# Patient Record
Sex: MALE | Race: OTHER | HISPANIC OR LATINO | URBAN - METROPOLITAN AREA
[De-identification: names, ages, dates, MRNs, and addresses within clinical notes are randomized per-mention and may not be internally consistent; named-entity substitution may affect disease eponyms.]

---

## 2018-04-12 VITALS
DIASTOLIC BLOOD PRESSURE: 81 MMHG | HEART RATE: 98 BPM | OXYGEN SATURATION: 99 % | RESPIRATION RATE: 18 BRPM | TEMPERATURE: 99 F | SYSTOLIC BLOOD PRESSURE: 170 MMHG

## 2018-04-12 RX ORDER — CHLORHEXIDINE GLUCONATE 213 G/1000ML
1 SOLUTION TOPICAL ONCE
Qty: 0 | Refills: 0 | Status: DISCONTINUED | OUTPATIENT
Start: 2018-04-13 | End: 2018-04-14

## 2018-04-12 NOTE — H&P ADULT - HISTORY OF PRESENT ILLNESS
#[id number]  79 yo Polish Speaking male, former smoker, Fhx of CAD/MI, SHELLFISH Allergy (rxn: rash), with a PMHx of known severe 3VCAD s/p 3VCABG  (LIMA-LAD patent, SVG-OM occluded, SVG-D1 occluded, SVG-RPDA occluded by angiogram 2017), [place] [facility] [place] JADA to prox/mid/distal RCA, ischemic cardiomyopathy, HTN, hyperlipidemia presented to cardiologist complaining of substernal chest pain radiating to his back and left arm associated with SOB and dizziness, occurring independent of activity x several months. Patient reports symptoms are exacerbated when ambulating more than 1-2 city blocks relieved with rest and taking SL NTG. Patient reports he uses up to 3 SL NTG/day to relieve chest discomfort. He denies syncope, leg edema, PND, orthopnea, palpitations, N/V, diaphoresis.  Patient was referred for Exercise NST (2018) revealed moderate sized, reversible defect of the lateral wall with moderate ischemia. Small to moderate myocardial perfusion defect of the inferior wall with moderate ischemia. LVEF 40%. Reduced myocardial thickening and motion of the inferior and lateral wall of LV. Echo (2018) revealed basal inferior wall hypokinetic, LVEF 45-50%, trace MR, LA dilated. In light of patient’s risk factors, extensive history of CAD and CCS Angina Class IV Symptoms, pt now presents for cardiac catheterization with possible intervention if clinically indicated.   Cardiac Catheterization Hx  Cardiac Catheterization (2017 [place] [facility] [place] Anatomy: pLM 90% stenosis, pLAD 95% stenosis, 1st Diag 80% tubular stenosis of proximal third of vessel. pLCx 90% stenosis, pRCA 70% tubular stenosis (culprit), mRCA 995% stenosis at distal margin of stented segment (culprit), dRCA 70% ISR (culprit), Graft to 1st Dia% stenosis at proximal anastomosis, Graft to OM1 100% stenosis at proximal anastomosis, graft to RPDA 100% stenosis at proximal anastomosis. LVEF 35%. Intervention: JADA pRCA, JADA mRCA, JADA dRCA. Residual disease of very distal RCA and RPDA is not amenable to PCI and will require ongoing medication therapy.  #[id number]  77 yo English/English Speaking male, former smoker, Fhx of CAD/MI, SHELLFISH Allergy (rxn: rash), with a PMHx of known severe 3VCAD s/p 3VCABG  (LIMA-LAD patent, SVG-OM occluded, SVG-D1 occluded, SVG-RPDA occluded by angiogram 2017), [place] [facility] [place]ADA to prox/mid/distal RCA, ischemic cardiomyopathy, HTN, hyperlipidemia presented to cardiologist complaining of substernal chest pain radiating to his back and left arm associated with SOB and dizziness, occurring independent of activity x several months. Patient reports symptoms are exacerbated when ambulating more than 1-2 city blocks relieved with rest and taking SL NTG. Patient reports he uses up to 3 SL NTG/day to relieve chest discomfort. He denies syncope, leg edema, PND, orthopnea, palpitations, N/V, diaphoresis.  Patient was referred for Exercise NST (2018) revealed moderate sized, reversible defect of the lateral wall with moderate ischemia. Small to moderate myocardial perfusion defect of the inferior wall with moderate ischemia. LVEF 40%. Reduced myocardial thickening and motion of the inferior and lateral wall of LV. Echo (2018) revealed basal inferior wall hypokinetic, LVEF 45-50%, trace MR, LA dilated. In light of patient’s risk factors, extensive history of CAD and CCS Angina Class IV Symptoms, pt now presents for cardiac catheterization with possible intervention if clinically indicated.   Cardiac Catheterization Hx  [place] [facility] [place]atomy: pLM 90% stenosis, pLAD 95% stenosis, 1st Diag 80% tubular stenosis of proximal third of vessel. pLCx 90% stenosis, pRCA 70% tubular stenosis (culprit), mRCA 995% stenosis at distal margin of stented segment (culprit), dRCA 70% ISR (culprit), Graft to 1st Dia% stenosis at proximal anastomosis, Graft to OM1 100% stenosis at proximal anastomosis, graft to RPDA 100% stenosis at proximal anastomosis. LVEF 35%. Intervention: JADA pRCA, JADA mRCA, JADA dRCA. Residual disease of very distal RCA and RPDA is not amenable to PCI and will require ongoing medication therapy.  #[id number]  77 yo Japanese/English Speaking male, former smoker, Fhx of CAD/MI, SHELLFISH Allergy (rxn: rash), with a PMHx of stage 3 CKD (CR: 1.5 as outpatient per Dr. Garcia). known severe 3VCAD s/p 3VCABG  (LIMA-LAD patent, SVG-OM occluded, SVG-D1 occluded, SVG-RPDA occluded by angiogram 2017), [place] [facility] [place]ADA to prox/mid/distal RCA, ischemic cardiomyopathy, HTN, hyperlipidemia presented to cardiologist complaining of substernal chest pain radiating to his back and left arm associated with SOB and dizziness, occurring independent of activity x several months. Patient reports symptoms are exacerbated when ambulating more than 1-2 city blocks relieved with rest and taking SL NTG. Patient reports he uses up to 3 SL NTG/day to relieve chest discomfort. He denies syncope, leg edema, PND, orthopnea, palpitations, N/V, diaphoresis.  Patient was referred for Exercise NST (2018) revealed moderate sized, reversible defect of the lateral wall with moderate ischemia. Small to moderate myocardial perfusion defect of the inferior wall with moderate ischemia. LVEF 40%. Reduced myocardial thickening and motion of the inferior and lateral wall of LV. Echo (2018) revealed basal inferior wall hypokinetic, LVEF 45-50%, trace MR, LA dilated. In light of patient’s risk factors, extensive history of CAD and CCS Angina Class IV Symptoms, pt now presents for cardiac catheterization with possible intervention if clinically indicated.   Cardiac Catheterization Hx  [place] [facility] [place]atomy: pLM 90% stenosis, pLAD 95% stenosis, 1st Diag 80% tubular stenosis of proximal third of vessel. pLCx 90% stenosis, pRCA 70% tubular stenosis (culprit), mRCA 995% stenosis at distal margin of stented segment (culprit), dRCA 70% ISR (culprit), Graft to 1st Dia% stenosis at proximal anastomosis, Graft to OM1 100% stenosis at proximal anastomosis, graft to RPDA 100% stenosis at proximal anastomosis. LVEF 35%. Intervention: JADA pRCA, JADA mRCA, JADA dRCA. Residual disease of very distal RCA and RPDA is not amenable to PCI and will require ongoing medication therapy.

## 2018-04-12 NOTE — H&P ADULT - ASSESSMENT
77 yo New Zealander Speaking male, former smoker, Fhx of CAD/MI, SHELLFISH Allergy (rxn: rash), with a PMHx of known severe 3VCAD s/p 3VCABG 1990 (LIMA-LAD patent, SVG-OM occluded, SVG-D1 occluded, SVG-RPDA occluded by angiogram 11/2017), hx of NSTEMI 11/2017 @ [facility] [place] with JADA to prox/mid/distal RCA, ischemic cardiomyopathy, HTN, hyperlipidemia presents for cardiac catheterization with possible intervention if clinically indicated due to patient's  risk factors, extensive history of CAD and CCS Angina Class IV Symptoms.      Risks & benefits of procedure and alternative therapy have been explained to the patient including but not limited to: allergic reaction, bleeding w/possible need for blood transfusion, infection, renal and vascular compromise, limb damage, arrhythmia, stroke, vessel dissection/perforation, Myocardial infarction, emergent CABG. Informed consent obtained and in chart. 79 yo Mosotho Speaking male, former smoker, Fhx of CAD/MI, SHELLFISH Allergy (rxn: rash), with a PMHx of known severe 3VCAD s/p 3VCABG 1990 (LIMA-LAD patent, SVG-OM occluded, SVG-D1 occluded, SVG-RPDA occluded by angiogram 11/2017), [place]x of [place] 11[place] [facility] [place] with JADA to prox/mid/distal RCA, ischemic cardiomyopathy, HTN, hyperlipidemia presents for cardiac catheterization with possible intervention if clinically indicated due to patient's  risk factors, extensive history of CAD and CCS Angina Class IV Symptoms.    -Pt reports compliance with ASA/Brilinta; did not take this AM; will give maintenance dose  -Patient allergic to all shellfish; with reaction of rash. Patient does not recall being premedicated in past for catheterization. Will discuss further with Dr. Garcia.     Risks & benefits of procedure and alternative therapy have been explained to the patient including but not limited to: allergic reaction, bleeding w/possible need for blood transfusion, infection, renal and vascular compromise, limb damage, arrhythmia, stroke, vessel dissection/perforation, Myocardial infarction, emergent CABG. Informed consent obtained and in chart. 79 yo Maldivian Speaking male, former smoker, Fhx of CAD/MI, SHELLFISH Allergy (rxn: rash), with a PMHx of known severe 3VCAD s/p 3VCABG 1990 (LIMA-LAD patent, SVG-OM occluded, SVG-D1 occluded, SVG-RPDA occluded by angiogram 11/2017), hx of NSTEMI 11/2017 @ [facility] [place] with JADA to prox/mid/distal RCA, ischemic cardiomyopathy, HTN, hyperlipidemia presents for cardiac catheterization with possible intervention if clinically indicated due to patient's  risk factors, extensive history of CAD and CCS Angina Class IV Symptoms.    -Pt reports compliance with ASA/Brilinta; did not take this AM; will give maintenance dose  -CR: 1.41 today; Dr. Garcia aware; no comparison labs available prior to procedure. NS @ 75 cc/hour started upon arrival to cath lab.   -Patient allergic to all shellfish; with reaction of rash. Per Dr. Garcia Solucortef 200 mg IV x one dose and Benadryl on call ordered.     Risks & benefits of procedure and alternative therapy have been explained to the patient including but not limited to: allergic reaction, bleeding w/possible need for blood transfusion, infection, renal and vascular compromise, limb damage, arrhythmia, stroke, vessel dissection/perforation, Myocardial infarction, emergent CABG. Informed consent obtained and in chart. 77 yo Northern Irish Speaking male, former smoker, Fhx of CAD/MI, SHELLFISH Allergy (rxn: rash), with a PMHx of known severe 3VCAD s/p 3VCABG 1990 (LIMA-LAD patent, SVG-OM occluded, SVG-D1 occluded, SVG-RPDA occluded by angiogram 11/2017), [place] of [place] [facility] [place] with JADA to prox/mid/distal RCA, ischemic cardiomyopathy, HTN, hyperlipidemia presents for cardiac catheterization with possible intervention if clinically indicated due to patient's  risk factors, extensive history of CAD and CCS Angina Class IV Symptoms.    -Pt reports compliance with ASA/Brilinta; did not take this AM; will give maintenance dose  -CR: 1.41 today; per Dr. Garcia CR: 1.5 as outpatient. . NS @ 75 cc/hour started upon arrival to cath lab.   -Patient allergic to all shellfish; with reaction of rash. Per Dr. Garcia Solucortef 200 mg IV x one dose and Benadryl on call ordered.     Risks & benefits of procedure and alternative therapy have been explained to the patient including but not limited to: allergic reaction, bleeding w/possible need for blood transfusion, infection, renal and vascular compromise, limb damage, arrhythmia, stroke, vessel dissection/perforation, Myocardial infarction, emergent CABG. Informed consent obtained and in chart.

## 2018-04-13 ENCOUNTER — INPATIENT (INPATIENT)
Facility: HOSPITAL | Age: 78
LOS: 0 days | Discharge: HOME | DRG: 246 | End: 2018-04-14
Attending: INTERNAL MEDICINE | Admitting: INTERNAL MEDICINE
Payer: MEDICARE

## 2018-04-13 DIAGNOSIS — Z95.1 PRESENCE OF AORTOCORONARY BYPASS GRAFT: Chronic | ICD-10-CM

## 2018-04-13 LAB
ANION GAP SERPL CALC-SCNC: 14 MMOL/L — SIGNIFICANT CHANGE UP (ref 5–17)
APTT BLD: 31.7 SEC — SIGNIFICANT CHANGE UP (ref 27.5–37.4)
BASOPHILS NFR BLD AUTO: 0.5 % — SIGNIFICANT CHANGE UP (ref 0–2)
BUN SERPL-MCNC: 21 MG/DL — SIGNIFICANT CHANGE UP (ref 7–23)
CALCIUM SERPL-MCNC: 10 MG/DL — SIGNIFICANT CHANGE UP (ref 8.4–10.5)
CHLORIDE SERPL-SCNC: 101 MMOL/L — SIGNIFICANT CHANGE UP (ref 96–108)
CK MB CFR SERPL CALC: 1.8 NG/ML — SIGNIFICANT CHANGE UP (ref 0–6.7)
CO2 SERPL-SCNC: 26 MMOL/L — SIGNIFICANT CHANGE UP (ref 22–31)
CREAT SERPL-MCNC: 1.41 MG/DL — HIGH (ref 0.5–1.3)
CRP SERPL-MCNC: 0.26 MG/DL — SIGNIFICANT CHANGE UP (ref 0–0.4)
EOSINOPHIL NFR BLD AUTO: 4.1 % — SIGNIFICANT CHANGE UP (ref 0–6)
GLUCOSE SERPL-MCNC: 101 MG/DL — HIGH (ref 70–99)
HBA1C BLD-MCNC: 5.8 % — HIGH (ref 4–5.6)
HCT VFR BLD CALC: 42.7 % — SIGNIFICANT CHANGE UP (ref 39–50)
HGB BLD-MCNC: 14 G/DL — SIGNIFICANT CHANGE UP (ref 13–17)
INR BLD: 1.01 — SIGNIFICANT CHANGE UP (ref 0.88–1.16)
LYMPHOCYTES # BLD AUTO: 34.7 % — SIGNIFICANT CHANGE UP (ref 13–44)
MCHC RBC-ENTMCNC: 29.4 PG — SIGNIFICANT CHANGE UP (ref 27–34)
MCHC RBC-ENTMCNC: 32.8 G/DL — SIGNIFICANT CHANGE UP (ref 32–36)
MCV RBC AUTO: 89.7 FL — SIGNIFICANT CHANGE UP (ref 80–100)
MONOCYTES NFR BLD AUTO: 7.4 % — SIGNIFICANT CHANGE UP (ref 2–14)
NEUTROPHILS NFR BLD AUTO: 53.3 % — SIGNIFICANT CHANGE UP (ref 43–77)
PLATELET # BLD AUTO: 200 K/UL — SIGNIFICANT CHANGE UP (ref 150–400)
POTASSIUM SERPL-MCNC: 4.3 MMOL/L — SIGNIFICANT CHANGE UP (ref 3.5–5.3)
POTASSIUM SERPL-SCNC: 4.3 MMOL/L — SIGNIFICANT CHANGE UP (ref 3.5–5.3)
PROTHROM AB SERPL-ACNC: 11.2 SEC — SIGNIFICANT CHANGE UP (ref 9.8–12.7)
RBC # BLD: 4.76 M/UL — SIGNIFICANT CHANGE UP (ref 4.2–5.8)
RBC # FLD: 14.6 % — SIGNIFICANT CHANGE UP (ref 10.3–16.9)
SODIUM SERPL-SCNC: 141 MMOL/L — SIGNIFICANT CHANGE UP (ref 135–145)
WBC # BLD: 8.8 K/UL — SIGNIFICANT CHANGE UP (ref 3.8–10.5)
WBC # FLD AUTO: 8.8 K/UL — SIGNIFICANT CHANGE UP (ref 3.8–10.5)

## 2018-04-13 PROCEDURE — 93459 L HRT ART/GRFT ANGIO: CPT | Mod: 26,XU

## 2018-04-13 PROCEDURE — 93571 IV DOP VEL&/PRESS C FLO 1ST: CPT | Mod: 26

## 2018-04-13 PROCEDURE — 92928 PRQ TCAT PLMT NTRAC ST 1 LES: CPT | Mod: RC

## 2018-04-13 RX ORDER — HYDROCORTISONE 20 MG
200 TABLET ORAL ONCE
Qty: 0 | Refills: 0 | Status: COMPLETED | OUTPATIENT
Start: 2018-04-13 | End: 2018-04-13

## 2018-04-13 RX ORDER — LISINOPRIL 2.5 MG/1
20 TABLET ORAL DAILY
Qty: 0 | Refills: 0 | Status: DISCONTINUED | OUTPATIENT
Start: 2018-04-13 | End: 2018-04-14

## 2018-04-13 RX ORDER — TICAGRELOR 90 MG/1
90 TABLET ORAL
Qty: 0 | Refills: 0 | Status: DISCONTINUED | OUTPATIENT
Start: 2018-04-14 | End: 2018-04-14

## 2018-04-13 RX ORDER — ASPIRIN/CALCIUM CARB/MAGNESIUM 324 MG
81 TABLET ORAL ONCE
Qty: 0 | Refills: 0 | Status: COMPLETED | OUTPATIENT
Start: 2018-04-13 | End: 2018-04-13

## 2018-04-13 RX ORDER — SODIUM CHLORIDE 9 MG/ML
500 INJECTION INTRAMUSCULAR; INTRAVENOUS; SUBCUTANEOUS
Qty: 0 | Refills: 0 | Status: DISCONTINUED | OUTPATIENT
Start: 2018-04-13 | End: 2018-04-13

## 2018-04-13 RX ORDER — TICAGRELOR 90 MG/1
90 TABLET ORAL ONCE
Qty: 0 | Refills: 0 | Status: COMPLETED | OUTPATIENT
Start: 2018-04-13 | End: 2018-04-13

## 2018-04-13 RX ORDER — SODIUM CHLORIDE 9 MG/ML
1000 INJECTION, SOLUTION INTRAVENOUS
Qty: 0 | Refills: 0 | Status: DISCONTINUED | OUTPATIENT
Start: 2018-04-13 | End: 2018-04-14

## 2018-04-13 RX ORDER — ATORVASTATIN CALCIUM 80 MG/1
1 TABLET, FILM COATED ORAL
Qty: 0 | Refills: 0 | COMMUNITY

## 2018-04-13 RX ORDER — ATORVASTATIN CALCIUM 80 MG/1
80 TABLET, FILM COATED ORAL AT BEDTIME
Qty: 0 | Refills: 0 | Status: DISCONTINUED | OUTPATIENT
Start: 2018-04-13 | End: 2018-04-14

## 2018-04-13 RX ORDER — RANOLAZINE 500 MG/1
500 TABLET, FILM COATED, EXTENDED RELEASE ORAL
Qty: 0 | Refills: 0 | Status: DISCONTINUED | OUTPATIENT
Start: 2018-04-13 | End: 2018-04-14

## 2018-04-13 RX ORDER — ISOSORBIDE MONONITRATE 60 MG/1
30 TABLET, EXTENDED RELEASE ORAL DAILY
Qty: 0 | Refills: 0 | Status: DISCONTINUED | OUTPATIENT
Start: 2018-04-13 | End: 2018-04-14

## 2018-04-13 RX ORDER — DIPHENHYDRAMINE HCL 50 MG
50 CAPSULE ORAL ONCE
Qty: 0 | Refills: 0 | Status: COMPLETED | OUTPATIENT
Start: 2018-04-13 | End: 2018-04-13

## 2018-04-13 RX ORDER — ASPIRIN/CALCIUM CARB/MAGNESIUM 324 MG
81 TABLET ORAL DAILY
Qty: 0 | Refills: 0 | Status: DISCONTINUED | OUTPATIENT
Start: 2018-04-14 | End: 2018-04-14

## 2018-04-13 RX ORDER — PANTOPRAZOLE SODIUM 20 MG/1
40 TABLET, DELAYED RELEASE ORAL
Qty: 0 | Refills: 0 | Status: DISCONTINUED | OUTPATIENT
Start: 2018-04-13 | End: 2018-04-14

## 2018-04-13 RX ORDER — CARVEDILOL PHOSPHATE 80 MG/1
6.25 CAPSULE, EXTENDED RELEASE ORAL EVERY 12 HOURS
Qty: 0 | Refills: 0 | Status: DISCONTINUED | OUTPATIENT
Start: 2018-04-13 | End: 2018-04-14

## 2018-04-13 RX ADMIN — TICAGRELOR 90 MILLIGRAM(S): 90 TABLET ORAL at 10:08

## 2018-04-13 RX ADMIN — PANTOPRAZOLE SODIUM 40 MILLIGRAM(S): 20 TABLET, DELAYED RELEASE ORAL at 17:43

## 2018-04-13 RX ADMIN — RANOLAZINE 500 MILLIGRAM(S): 500 TABLET, FILM COATED, EXTENDED RELEASE ORAL at 17:43

## 2018-04-13 RX ADMIN — ATORVASTATIN CALCIUM 80 MILLIGRAM(S): 80 TABLET, FILM COATED ORAL at 21:58

## 2018-04-13 RX ADMIN — SODIUM CHLORIDE 60 MILLILITER(S): 9 INJECTION, SOLUTION INTRAVENOUS at 14:58

## 2018-04-13 RX ADMIN — Medication 200 MILLIGRAM(S): at 10:15

## 2018-04-13 RX ADMIN — SODIUM CHLORIDE 75 MILLILITER(S): 9 INJECTION INTRAMUSCULAR; INTRAVENOUS; SUBCUTANEOUS at 09:25

## 2018-04-13 RX ADMIN — CARVEDILOL PHOSPHATE 6.25 MILLIGRAM(S): 80 CAPSULE, EXTENDED RELEASE ORAL at 17:43

## 2018-04-13 RX ADMIN — Medication 81 MILLIGRAM(S): at 10:07

## 2018-04-13 RX ADMIN — Medication 50 MILLIGRAM(S): at 10:15

## 2018-04-14 VITALS — DIASTOLIC BLOOD PRESSURE: 62 MMHG | SYSTOLIC BLOOD PRESSURE: 132 MMHG | HEART RATE: 58 BPM | RESPIRATION RATE: 16 BRPM

## 2018-04-14 LAB
ANION GAP SERPL CALC-SCNC: 13 MMOL/L — SIGNIFICANT CHANGE UP (ref 5–17)
BUN SERPL-MCNC: 27 MG/DL — HIGH (ref 7–23)
CALCIUM SERPL-MCNC: 9 MG/DL — SIGNIFICANT CHANGE UP (ref 8.4–10.5)
CHLORIDE SERPL-SCNC: 104 MMOL/L — SIGNIFICANT CHANGE UP (ref 96–108)
CO2 SERPL-SCNC: 24 MMOL/L — SIGNIFICANT CHANGE UP (ref 22–31)
CREAT SERPL-MCNC: 1.3 MG/DL — SIGNIFICANT CHANGE UP (ref 0.5–1.3)
GLUCOSE SERPL-MCNC: 86 MG/DL — SIGNIFICANT CHANGE UP (ref 70–99)
HCT VFR BLD CALC: 38.3 % — LOW (ref 39–50)
HGB BLD-MCNC: 12.1 G/DL — LOW (ref 13–17)
MAGNESIUM SERPL-MCNC: 2.1 MG/DL — SIGNIFICANT CHANGE UP (ref 1.6–2.6)
MCHC RBC-ENTMCNC: 28.7 PG — SIGNIFICANT CHANGE UP (ref 27–34)
MCHC RBC-ENTMCNC: 31.6 G/DL — LOW (ref 32–36)
MCV RBC AUTO: 90.8 FL — SIGNIFICANT CHANGE UP (ref 80–100)
PLATELET # BLD AUTO: 176 K/UL — SIGNIFICANT CHANGE UP (ref 150–400)
POTASSIUM SERPL-MCNC: 3.9 MMOL/L — SIGNIFICANT CHANGE UP (ref 3.5–5.3)
POTASSIUM SERPL-SCNC: 3.9 MMOL/L — SIGNIFICANT CHANGE UP (ref 3.5–5.3)
RBC # BLD: 4.22 M/UL — SIGNIFICANT CHANGE UP (ref 4.2–5.8)
RBC # FLD: 14.6 % — SIGNIFICANT CHANGE UP (ref 10.3–16.9)
SODIUM SERPL-SCNC: 141 MMOL/L — SIGNIFICANT CHANGE UP (ref 135–145)
WBC # BLD: 11.6 K/UL — HIGH (ref 3.8–10.5)
WBC # FLD AUTO: 11.6 K/UL — HIGH (ref 3.8–10.5)

## 2018-04-14 PROCEDURE — 99217: CPT

## 2018-04-14 RX ORDER — ATORVASTATIN CALCIUM 80 MG/1
1 TABLET, FILM COATED ORAL
Qty: 0 | Refills: 0 | COMMUNITY

## 2018-04-14 RX ORDER — NITROGLYCERIN 6.5 MG
1 CAPSULE, EXTENDED RELEASE ORAL
Qty: 0 | Refills: 0 | COMMUNITY

## 2018-04-14 RX ORDER — ASPIRIN/CALCIUM CARB/MAGNESIUM 324 MG
1 TABLET ORAL
Qty: 30 | Refills: 11 | OUTPATIENT
Start: 2018-04-14 | End: 2019-04-08

## 2018-04-14 RX ORDER — NITROGLYCERIN 6.5 MG
1 CAPSULE, EXTENDED RELEASE ORAL
Qty: 1 | Refills: 0 | OUTPATIENT
Start: 2018-04-14

## 2018-04-14 RX ORDER — POTASSIUM CHLORIDE 20 MEQ
40 PACKET (EA) ORAL ONCE
Qty: 0 | Refills: 0 | Status: COMPLETED | OUTPATIENT
Start: 2018-04-14 | End: 2018-04-14

## 2018-04-14 RX ADMIN — Medication 81 MILLIGRAM(S): at 10:29

## 2018-04-14 RX ADMIN — PANTOPRAZOLE SODIUM 40 MILLIGRAM(S): 20 TABLET, DELAYED RELEASE ORAL at 06:52

## 2018-04-14 RX ADMIN — ISOSORBIDE MONONITRATE 30 MILLIGRAM(S): 60 TABLET, EXTENDED RELEASE ORAL at 10:29

## 2018-04-14 RX ADMIN — TICAGRELOR 90 MILLIGRAM(S): 90 TABLET ORAL at 06:52

## 2018-04-14 RX ADMIN — LISINOPRIL 20 MILLIGRAM(S): 2.5 TABLET ORAL at 06:52

## 2018-04-14 RX ADMIN — Medication 40 MILLIEQUIVALENT(S): at 10:29

## 2018-04-14 RX ADMIN — RANOLAZINE 500 MILLIGRAM(S): 500 TABLET, FILM COATED, EXTENDED RELEASE ORAL at 06:52

## 2018-04-14 RX ADMIN — CARVEDILOL PHOSPHATE 6.25 MILLIGRAM(S): 80 CAPSULE, EXTENDED RELEASE ORAL at 07:03

## 2018-04-14 NOTE — DISCHARGE NOTE ADULT - PLAN OF CARE
Maintain Low Fat, Low Cholesterol and Low Salt You had a cardiac angiogram with stent placement to one of your heart artery branches (RPL). Continue Aspirin and Plavix. DO NOT STOP THESE MEDICATIONS UNLESS INSTRUCTED BY YOUR CARDIOLOGIST DR. GARCIA AS YOUR STENT CAN CLOSE RESULTING IN HEART ATTACK. Follow-up with Dr. Garcia in 1-2 weeks. You had a cardiac angiogram with stent placement and atherectomy (procedure to break up plaque) to one of your heart arteries (Right Coronary Artery). Continue Aspirin and Brilinta. DO NOT STOP THESE MEDICATIONS UNLESS INSTRUCTED BY YOUR CARDIOLOGIST DR. GARCIA AS YOUR STENT CAN CLOSE RESULTING IN HEART ATTACK. Follow-up with Dr. Garcia in 1-2 weeks. Monitor BP. Maintain Low Salt Diet (Less than 2 g (2000 mg daily) daily). Continue Lisinopril, Carvedilol, Amlodipine. Maintain Low Cholesterol Diet. Continue Atorvastatin. You have slightly decreased heart function which puts you at risk for fluid overload. Weigh yourself daily If you notice weight gain of 2 lbs or more, and/or shortness of breath and/or leg swelling contact your physician and proceed to nearest Emergency Room. Continue Lisinopril and Carvedilol. You have decreased kidney function. If you notice high blood pressure, swelling of legs or decrease in amount of urination contact your physician immediately.

## 2018-04-14 NOTE — DISCHARGE NOTE ADULT - INSTRUCTIONS
You underwent a coronary angiogram and should wait 3 days before returning to ordinary activities. Do not drive for 2 days. Consult your doctor before returning to vigorous activity. You may return to work in 3-5 days. The catheter from your right groin was removed. You may shower once the dressing is removed, but avoid baths, hot tubs, or swimming for 5 days to prevent infection. If you notice bleeding from the site, hardening and pain at the site, drainage or redness from the site, coolness/paleness of the right leg, weakness/paralysis of right leg (unable to lift or move), swelling, or fever, please call [phone]. Please continue your Aspirin and Brilinta as prescribed unless otherwise indicated by your cardiologist. Please follow up with Dr. Garcia in 1-2 weeks. All of your needed prescriptions have been sent electronically to your pharmacy.

## 2018-04-14 NOTE — DISCHARGE NOTE ADULT - MEDICATION SUMMARY - MEDICATIONS TO TAKE
I will START or STAY ON the medications listed below when I get home from the hospital:    Aspirin Enteric Coated 81 mg oral delayed release tablet  -- 1 tab(s) by mouth once a day  -- Indication: For Coronary artery disease (Heart), STENT    Zestril 20 mg oral tablet  -- 1 tab(s) by mouth once a day  -- Indication: For Hypertension (Blood Pressure)     Ranexa 500 mg oral tablet, extended release  -- 1 tab(s) by mouth 2 times a day  -- Indication: For Coronary artery disease (Heart),     nitroglycerin 0.4 mg sublingual tablet  -- 1 tab(s) under tongue every 5 minutes, As Needed - for chest pain Maximum Up To Three Doses    Dispense: 1 vial   -- Indication: For Chest Pain    Imdur 30 mg oral tablet, extended release  -- 1 tab(s) by mouth once a day (in the morning)  -- Indication: For Coronary artery disease (Heart),     Lipitor 80 mg oral tablet  -- 1 tab(s) by mouth once a day (at bedtime)  -- Indication: For Hyperlipidemia (Cholesterol)    Brilinta (ticagrelor) 90 mg oral tablet  -- 1 tab(s) by mouth 2 times a day  -- Indication: For Coronary artery disease (Heart), STENT    Coreg 6.25 mg oral tablet  -- 1 tab(s) by mouth 2 times a day  -- Indication: For Coronary artery disease (Heart), Chronic Systolic CHF     amLODIPine 5 mg oral tablet  -- 1 tab(s) by mouth once a day  -- Indication: For Hypertension (Blood Pressure)    Protonix 40 mg oral delayed release tablet  -- 1 tab(s) by mouth once a day  -- Indication: For Stomach Protection    Vitamin D3 50,000 intl units oral capsule  -- 1 cap(s) by mouth once a month  -- Indication: For Vitamin D deficiency I will START or STAY ON the medications listed below when I get home from the hospital:    Aspirin Enteric Coated 81 mg oral delayed release tablet  -- 1 tab(s) by mouth once a day  -- Indication: For Coronary artery disease (Heart), STENT    Zestril 20 mg oral tablet  -- 1 tab(s) by mouth once a day  -- Indication: For Hypertension (Blood Pressure)     Ranexa 500 mg oral tablet, extended release  -- 1 tab(s) by mouth 2 times a day  -- Indication: For Coronary artery disease (Heart),     nitroglycerin 0.4 mg sublingual tablet  -- 1 tab(s) under tongue every 5 minutes, As Needed - for chest pain Maximum Up To Three Doses    Dispense: 1 vial   -- Indication: For Chest Pain    Imdur 30 mg oral tablet, extended release  -- 1 tab(s) by mouth once a day (in the morning)  -- Indication: For Coronary artery disease (Heart),     Lipitor 80 mg oral tablet  -- 1 tab(s) by mouth once a day (at bedtime)  -- Indication: For Hyperlipidemia (Cholesterol), Coronary artery disease (Heart)    Brilinta (ticagrelor) 90 mg oral tablet  -- 1 tab(s) by mouth 2 times a day  -- Indication: For Coronary artery disease (Heart), STENT    Coreg 6.25 mg oral tablet  -- 1 tab(s) by mouth 2 times a day  -- Indication: For Coronary artery disease (Heart), Chronic Systolic CHF     amLODIPine 5 mg oral tablet  -- 1 tab(s) by mouth once a day  -- Indication: For Hypertension (Blood Pressure)    Protonix 40 mg oral delayed release tablet  -- 1 tab(s) by mouth once a day  -- Indication: For Stomach Protection    Vitamin D3 50,000 intl units oral capsule  -- 1 cap(s) by mouth once a month  -- Indication: For Vitamin D deficiency I will START or STAY ON the medications listed below when I get home from the hospital:    Aspirin Enteric Coated 81 mg oral delayed release tablet  -- 1 tab(s) by mouth once a day  -- Indication: For Coronary artery disease (Heart), STENT    Aspirin Enteric Coated 81 mg oral delayed release tablet  -- 1 tab(s) by mouth once a day   -- Swallow whole.  Do not crush.  Take with food or milk.    -- Indication: For Coronary artery disease (Heart), STENT    Zestril 20 mg oral tablet  -- 1 tab(s) by mouth once a day  -- Indication: For Hypertension (Blood Pressure)     Ranexa 500 mg oral tablet, extended release  -- 1 tab(s) by mouth 2 times a day  -- Indication: For Coronary artery disease (Heart),     nitroglycerin 0.4 mg sublingual tablet  -- 1 tab(s) under tongue every 5 minutes, As Needed - for chest pain Maximum Up To Three Doses    Dispense: 1 vial   -- Indication: For Chest Pain    Imdur 30 mg oral tablet, extended release  -- 1 tab(s) by mouth once a day (in the morning)  -- Indication: For Coronary artery disease (Heart),     Lipitor 80 mg oral tablet  -- 1 tab(s) by mouth once a day (at bedtime)  -- Indication: For Hyperlipidemia (Cholesterol), Coronary artery disease (Heart)    Brilinta (ticagrelor) 90 mg oral tablet  -- 1 tab(s) by mouth 2 times a day  -- Indication: For Coronary artery disease (Heart), STENT    Coreg 6.25 mg oral tablet  -- 1 tab(s) by mouth 2 times a day  -- Indication: For Coronary artery disease (Heart), Chronic Systolic CHF     amLODIPine 5 mg oral tablet  -- 1 tab(s) by mouth once a day  -- Indication: For Hypertension (Blood Pressure)    Protonix 40 mg oral delayed release tablet  -- 1 tab(s) by mouth once a day  -- Indication: For Stomach Protection    Vitamin D3 50,000 intl units oral capsule  -- 1 cap(s) by mouth once a month  -- Indication: For Vitamin D deficiency

## 2018-04-14 NOTE — DISCHARGE NOTE ADULT - SECONDARY DIAGNOSIS.
Hypertension Hyperlipidemia Chronic systolic CHF (congestive heart failure) Chronic kidney disease, stage 3 (moderate)

## 2018-04-14 NOTE — DISCHARGE NOTE ADULT - PATIENT PORTAL LINK FT
You can access the [email or web address] Patient Portal, offered by [facility], by registering with the following website: [email or web address]

## 2018-04-14 NOTE — DISCHARGE NOTE ADULT - CARE PLAN
Principal Discharge DX:	Coronary artery disease  Goal:	Maintain Low Fat, Low Cholesterol and Low Salt  Assessment and plan of treatment:	You had a cardiac angiogram with stent placement to one of your heart artery branches (RPL). Continue Aspirin and Plavix. DO NOT STOP THESE MEDICATIONS UNLESS INSTRUCTED BY YOUR CARDIOLOGIST DR. GARCIA AS YOUR STENT CAN CLOSE RESULTING IN HEART ATTACK. Follow-up with Dr. Garcia in 1-2 weeks.  Secondary Diagnosis:	Hypertension  Secondary Diagnosis:	Hyperlipidemia  Secondary Diagnosis:	Chronic systolic CHF (congestive heart failure) Principal Discharge DX:	Coronary artery disease  Goal:	Maintain Low Fat, Low Cholesterol and Low Salt  Assessment and plan of treatment:	You had a cardiac angiogram with stent placement and atherectomy (procedure to break up plaque) to one of your heart arteries (Right Coronary Artery). Continue Aspirin and Brilinta. DO NOT STOP THESE MEDICATIONS UNLESS INSTRUCTED BY YOUR CARDIOLOGIST DR. GARCIA AS YOUR STENT CAN CLOSE RESULTING IN HEART ATTACK. Follow-up with Dr. Garcia in 1-2 weeks.  Secondary Diagnosis:	Hypertension  Assessment and plan of treatment:	Monitor BP. Maintain Low Salt Diet (Less than 2 g (2000 mg daily) daily). Continue Lisinopril, Carvedilol, Amlodipine.  Secondary Diagnosis:	Hyperlipidemia  Assessment and plan of treatment:	Maintain Low Cholesterol Diet. Continue Atorvastatin.  Secondary Diagnosis:	Chronic systolic CHF (congestive heart failure)  Assessment and plan of treatment:	You have slightly decreased heart function which puts you at risk for fluid overload. Weigh yourself daily If you notice weight gain of 2 lbs or more, and/or shortness of breath and/or leg swelling contact your physician and proceed to nearest Emergency Room. Continue Lisinopril and Carvedilol.  Secondary Diagnosis:	Chronic kidney disease, stage 3 (moderate)  Assessment and plan of treatment:	You have decreased kidney function. If you notice high blood pressure, swelling of legs or decrease in amount of urination contact your physician immediately.

## 2018-04-14 NOTE — DISCHARGE NOTE ADULT - CARE PROVIDER_API CALL
Florin Garcia (MD), Cardiovascular Disease; Interventional Cardiology  [address] [place] [address]  Phone: [phone]  Fax: [phone]

## 2018-04-14 NOTE — DISCHARGE NOTE ADULT - HOSPITAL COURSE
77 yo Jordanian/English Speaking male, former smoker, Fhx of CAD/MI, SHELLFISH Allergy (rxn: rash), with a PMHx of stage 3 CKD (CR: 1.5 as outpatient per Dr. Garcia). known severe 3VCAD s/p 3VCABG 1990 (LIMA-LAD patent, SVG-OM occluded, SVG-D1 occluded, SVG-RPDA occluded by angiogram 11/2017), hx of NSTEMI 11/2017 @ [facility] [place] treated with JADA to prox/mid/distal RCA, ischemic cardiomyopathy, HTN, hyperlipidemia presented to cardiologist complaining of substernal chest pain radiating to his back and left arm associated with SOB and dizziness, occurring independent of activity x several months. Patient reports symptoms are exacerbated when ambulating more than 1-2 city blocks relieved with rest and taking SL NTG. Patient reports he uses up to 3 SL NTG/day to relieve chest discomfort. He denies syncope, leg edema, PND, orthopnea, palpitations, N/V, diaphoresis.  Patient was referred for Exercise NST (4/2/2018) revealed moderate sized, reversible defect of the lateral wall with moderate ischemia. Small to moderate myocardial perfusion defect of the inferior wall with moderate ischemia. LVEF 40%. Reduced myocardial thickening and motion of the inferior and lateral wall of LV. Echo (4/2/2018) revealed basal inferior wall hypokinetic, LVEF 45-50%, trace MR, LA dilated. Patient s/p cardiac cath 4/13: 3VCAD ; successful JADA/ PTCA/laser atherectomy dRCA and PTCA mRCA.  LIMA to LAD is widely patent, SVG to D1, SVG to OM1 both occluded as per previous angio 11/8/17, LM: medium size 90% mid body tubular stenosis; LAD: proximal 90% tubular lesion, mid 99% diffuse ; D1: medium sized proximal 70% diffuse mid segment stent 10% ISR, distal 70% focal lesion, D2: medium size 60% ostial , Ramus: medium size 30% diffuse disease, LCx: small to medium size diffuse, 90-95% diffuse stenosis; RCA: large dominant, proximal stent 10% ISR, mid vessel stent 70% ISR, distal 99% ISR; RPDA/PLS: small size diffuse disease, LVEDP 15, LVEF 40 % by NST 4/2/18.  Atropine 0.5 mg given in room for HR 33-43. During infusion of saline prior to laser atherectomy the patient experienced an episode of VFib that was quickly terminated with one 200J shock. Sinus rhythm restored in less than 1 minute. CKD Stage III- Cr 1.41 on admission improved to 1.3 after IV Hydration overnight. Patient urinating well and euvolemic. Labs, telemetry reviewed. Hypokalemia K+3.9 Kdur 40 mEq PO x 1 given. Leukocytosis WBC 11.6 likely secondary to steroids given for Premedication for Shellfish Allergy on 4/13/18. Patient C/P free and HD stable and cleared for discharge by Dr. Anders. [facility] contacted and patient has prescriptions with refills for Brilinta, Lipitor, Carvedilol, Amlodipine, Imdur, Lisinopril. Prescription for NTG and ASA E-prescribed to [facility]. Telemetry significant for PVCs and SB 40’s however patient ambulated without difficulty and is asymptomatic.   V/S:	BP: 	120’s 50-60s 	HR: 40-60s 	RR: 15 	Temp:  96.8 F  Monitor-	SB, NSR, PVCs		O2 sat-97% RA   	  GENERAL: Laying in bed in no acute distress  CVS: + S1 S2. RRR. No murmurs, rubs or gallops. +Well healed midsternal scar  Pulm: CTA Bilaterally. No rhonchi, wheezes, or rales.  Abd: BS x 4. Soft NT/ND.  Ext: No clubbing, cyanosis, or edema BLE. No calf tenderness BLE.   Right Groin: Soft. Non-tender. No hematoma, bleed or bruit  Distal Pulses Intact to Baseline 79 yo Bulgarian/English Speaking male, former smoker, Fhx of CAD/MI, SHELLFISH Allergy (rxn: rash), with a PMHx of stage 3 CKD (CR: 1.5 as outpatient per Dr. Garcia). known severe 3VCAD s/p 3VCABG 1990 (LIMA-LAD patent, SVG-OM occluded, SVG-D1 occluded, SVG-RPDA occluded by angiogram 11/2017), [place] [facility] [place] JADA to prox/mid/distal RCA, ischemic cardiomyopathy, HTN, hyperlipidemia presented to cardiologist complaining of substernal chest pain radiating to his back and left arm associated with SOB and dizziness, occurring independent of activity x several months. Patient reports symptoms are exacerbated when ambulating more than 1-2 city blocks relieved with rest and taking SL NTG. Patient reports he uses up to 3 SL NTG/day to relieve chest discomfort. He denies syncope, leg edema, PND, orthopnea, palpitations, N/V, diaphoresis.  Patient was referred for Exercise NST (4/2/2018) revealed moderate sized, reversible defect of the lateral wall with moderate ischemia. Small to moderate myocardial perfusion defect of the inferior wall with moderate ischemia. LVEF 40%. Reduced myocardial thickening and motion of the inferior and lateral wall of LV. Echo (4/2/2018) revealed basal inferior wall hypokinetic, LVEF 45-50%, trace MR, LA dilated. Patient s/p cardiac cath 4/13: 3VCAD ; successful JADA/ PTCA/laser atherectomy dRCA and PTCA mRCA.  LIMA to LAD is widely patent, SVG to D1, SVG to OM1 both occluded as per previous angio 11/8/17, LM: medium size 90% mid body tubular stenosis; LAD: proximal 90% tubular lesion, mid 99% diffuse ; D1: medium sized proximal 70% diffuse mid segment stent 10% ISR, distal 70% focal lesion, D2: medium size 60% ostial , Ramus: medium size 30% diffuse disease, LCx: small to medium size diffuse, 90-95% diffuse stenosis; RCA: large dominant, proximal stent 10% ISR, mid vessel stent 70% ISR, distal 99% ISR; RPDA/PLS: small size diffuse disease, LVEDP 15, LVEF 40 % by NST 4/2/18.  Atropine 0.5 mg given in room for HR 33-43. During infusion of saline prior to laser atherectomy the patient experienced an episode of VFib that was quickly terminated with one 200J shock. Sinus rhythm restored in less than 1 minute. CKD Stage III- Cr 1.41 on admission improved to 1.3 after IV Hydration overnight. Patient urinating well and euvolemic. Labs, telemetry and EKG reviewed. Hypokalemia K+3.9 Kdur 40 mEq PO x 1 given. Leukocytosis WBC 11.6 likely secondary to steroids given for Premedication for Shellfish Allergy on 4/13/18. Patient C/P free and HD stable and cleared for discharge by Dr. Anders. [facility] contacted and patient has prescriptions with refills for Brilinta, Lipitor, Carvedilol, Amlodipine, Imdur, Lisinopril. Prescription for NTG and ASA E-prescribed to [facility]. Telemetry significant for PVCs and SB 40’s however patient ambulated without difficulty and is asymptomatic.   V/S:	BP: 	120’s 50-60s 	HR: 40-60s 	RR: 15 	Temp:  96.8 F  Monitor-	SB, NSR, PVCs		O2 sat-97% RA   	  GENERAL: Laying in bed in no acute distress  CVS: + S1 S2. RRR. No murmurs, rubs or gallops. +Well healed midsternal scar  Pulm: CTA Bilaterally. No rhonchi, wheezes, or rales.  Abd: BS x 4. Soft NT/ND.  Ext: No clubbing, cyanosis, or edema BLE. No calf tenderness BLE.   Right Groin: Soft. Non-tender. No hematoma, bleed or bruit  Distal Pulses Intact to Baseline

## 2018-04-16 PROCEDURE — 80048 BASIC METABOLIC PNL TOTAL CA: CPT

## 2018-04-16 PROCEDURE — C1889: CPT

## 2018-04-16 PROCEDURE — 82553 CREATINE MB FRACTION: CPT

## 2018-04-16 PROCEDURE — 82550 ASSAY OF CK (CPK): CPT

## 2018-04-16 PROCEDURE — 85610 PROTHROMBIN TIME: CPT

## 2018-04-16 PROCEDURE — C1725: CPT

## 2018-04-16 PROCEDURE — C1769: CPT

## 2018-04-16 PROCEDURE — 85025 COMPLETE CBC W/AUTO DIFF WBC: CPT

## 2018-04-16 PROCEDURE — 85027 COMPLETE CBC AUTOMATED: CPT

## 2018-04-16 PROCEDURE — 85730 THROMBOPLASTIN TIME PARTIAL: CPT

## 2018-04-16 PROCEDURE — C1887: CPT

## 2018-04-16 PROCEDURE — 86140 C-REACTIVE PROTEIN: CPT

## 2018-04-16 PROCEDURE — 36415 COLL VENOUS BLD VENIPUNCTURE: CPT

## 2018-04-16 PROCEDURE — C1885: CPT

## 2018-04-16 PROCEDURE — C1760: CPT

## 2018-04-16 PROCEDURE — 83036 HEMOGLOBIN GLYCOSYLATED A1C: CPT

## 2018-04-16 PROCEDURE — C1894: CPT

## 2018-04-16 PROCEDURE — 83735 ASSAY OF MAGNESIUM: CPT

## 2018-04-16 PROCEDURE — C1874: CPT

## 2018-04-18 DIAGNOSIS — I49.01 VENTRICULAR FIBRILLATION: ICD-10-CM

## 2018-04-18 DIAGNOSIS — T38.0X5A ADVERSE EFFECT OF GLUCOCORTICOIDS AND SYNTHETIC ANALOGUES, INITIAL ENCOUNTER: ICD-10-CM

## 2018-04-18 DIAGNOSIS — E11.22 TYPE 2 DIABETES MELLITUS WITH DIABETIC CHRONIC KIDNEY DISEASE: ICD-10-CM

## 2018-04-18 DIAGNOSIS — Z79.84 LONG TERM (CURRENT) USE OF ORAL HYPOGLYCEMIC DRUGS: ICD-10-CM

## 2018-04-18 DIAGNOSIS — Z91.013 ALLERGY TO SEAFOOD: ICD-10-CM

## 2018-04-18 DIAGNOSIS — E78.5 HYPERLIPIDEMIA, UNSPECIFIED: ICD-10-CM

## 2018-04-18 DIAGNOSIS — I25.5 ISCHEMIC CARDIOMYOPATHY: ICD-10-CM

## 2018-04-18 DIAGNOSIS — I25.110 ATHEROSCLEROTIC HEART DISEASE OF NATIVE CORONARY ARTERY WITH UNSTABLE ANGINA PECTORIS: ICD-10-CM

## 2018-04-18 DIAGNOSIS — I13.0 HYPERTENSIVE HEART AND CHRONIC KIDNEY DISEASE WITH HEART FAILURE AND STAGE 1 THROUGH STAGE 4 CHRONIC KIDNEY DISEASE, OR UNSPECIFIED CHRONIC KIDNEY DISEASE: ICD-10-CM

## 2018-04-18 DIAGNOSIS — Z87.891 PERSONAL HISTORY OF NICOTINE DEPENDENCE: ICD-10-CM

## 2018-04-18 DIAGNOSIS — N18.3 CHRONIC KIDNEY DISEASE, STAGE 3 (MODERATE): ICD-10-CM

## 2018-04-18 DIAGNOSIS — I25.82 CHRONIC TOTAL OCCLUSION OF CORONARY ARTERY: ICD-10-CM

## 2018-04-18 DIAGNOSIS — I50.32 CHRONIC DIASTOLIC (CONGESTIVE) HEART FAILURE: ICD-10-CM

## 2018-04-18 DIAGNOSIS — D72.829 ELEVATED WHITE BLOOD CELL COUNT, UNSPECIFIED: ICD-10-CM
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
## 2018-04-18 DIAGNOSIS — I25.2 OLD MYOCARDIAL INFARCTION: ICD-10-CM

## 2018-04-18 DIAGNOSIS — I25.710 ATHEROSCLEROSIS OF AUTOLOGOUS VEIN CORONARY ARTERY BYPASS GRAFT(S) WITH UNSTABLE ANGINA PECTORIS: ICD-10-CM

## 2018-04-18 DIAGNOSIS — E87.6 HYPOKALEMIA: ICD-10-CM

## 2018-04-18 DIAGNOSIS — T82.855A STENOSIS OF CORONARY ARTERY STENT, INITIAL ENCOUNTER: ICD-10-CM

## 2018-10-12 ENCOUNTER — INPATIENT (INPATIENT)
Facility: HOSPITAL | Age: 78
LOS: 3 days | Discharge: ANOTHER HOSPITAL | DRG: 287 | End: 2018-10-16
Attending: INTERNAL MEDICINE | Admitting: INTERNAL MEDICINE
Payer: MEDICARE

## 2018-10-12 VITALS
TEMPERATURE: 98 F | RESPIRATION RATE: 18 BRPM | DIASTOLIC BLOOD PRESSURE: 74 MMHG | OXYGEN SATURATION: 98 % | SYSTOLIC BLOOD PRESSURE: 178 MMHG | HEART RATE: 67 BPM

## 2018-10-12 DIAGNOSIS — Z95.1 PRESENCE OF AORTOCORONARY BYPASS GRAFT: Chronic | ICD-10-CM

## 2018-10-12 DIAGNOSIS — R07.9 CHEST PAIN, UNSPECIFIED: ICD-10-CM

## 2018-10-12 LAB
ANION GAP SERPL CALC-SCNC: 7 MMOL/L — SIGNIFICANT CHANGE UP (ref 5–17)
BASOPHILS # BLD AUTO: 0.1 K/UL — SIGNIFICANT CHANGE UP (ref 0–0.2)
BASOPHILS NFR BLD AUTO: 1.1 % — SIGNIFICANT CHANGE UP (ref 0–2)
BUN SERPL-MCNC: 27 MG/DL — HIGH (ref 7–18)
CALCIUM SERPL-MCNC: 9.6 MG/DL — SIGNIFICANT CHANGE UP (ref 8.4–10.5)
CHLORIDE SERPL-SCNC: 107 MMOL/L — SIGNIFICANT CHANGE UP (ref 96–108)
CK MB BLD-MCNC: <0.7 % — SIGNIFICANT CHANGE UP (ref 0–3.5)
CK MB CFR SERPL CALC: <1 NG/ML — SIGNIFICANT CHANGE UP (ref 0–3.6)
CK SERPL-CCNC: 143 U/L — SIGNIFICANT CHANGE UP (ref 35–232)
CO2 SERPL-SCNC: 24 MMOL/L — SIGNIFICANT CHANGE UP (ref 22–31)
CREAT SERPL-MCNC: 1.22 MG/DL — SIGNIFICANT CHANGE UP (ref 0.5–1.3)
EOSINOPHIL # BLD AUTO: 0.4 K/UL — SIGNIFICANT CHANGE UP (ref 0–0.5)
EOSINOPHIL NFR BLD AUTO: 3.4 % — SIGNIFICANT CHANGE UP (ref 0–6)
GLUCOSE SERPL-MCNC: 103 MG/DL — HIGH (ref 70–99)
HCT VFR BLD CALC: 39.1 % — SIGNIFICANT CHANGE UP (ref 39–50)
HGB BLD-MCNC: 12.6 G/DL — LOW (ref 13–17)
LYMPHOCYTES # BLD AUTO: 38.3 % — SIGNIFICANT CHANGE UP (ref 13–44)
LYMPHOCYTES # BLD AUTO: 4.2 K/UL — HIGH (ref 1–3.3)
MCHC RBC-ENTMCNC: 29.7 PG — SIGNIFICANT CHANGE UP (ref 27–34)
MCHC RBC-ENTMCNC: 32.2 GM/DL — SIGNIFICANT CHANGE UP (ref 32–36)
MCV RBC AUTO: 92.1 FL — SIGNIFICANT CHANGE UP (ref 80–100)
MONOCYTES # BLD AUTO: 0.7 K/UL — SIGNIFICANT CHANGE UP (ref 0–0.9)
MONOCYTES NFR BLD AUTO: 6.6 % — SIGNIFICANT CHANGE UP (ref 2–14)
NEUTROPHILS # BLD AUTO: 5.6 K/UL — SIGNIFICANT CHANGE UP (ref 1.8–7.4)
NEUTROPHILS NFR BLD AUTO: 50.6 % — SIGNIFICANT CHANGE UP (ref 43–77)
PLATELET # BLD AUTO: 214 K/UL — SIGNIFICANT CHANGE UP (ref 150–400)
POTASSIUM SERPL-MCNC: 5 MMOL/L — SIGNIFICANT CHANGE UP (ref 3.5–5.3)
POTASSIUM SERPL-SCNC: 5 MMOL/L — SIGNIFICANT CHANGE UP (ref 3.5–5.3)
RBC # BLD: 4.25 M/UL — SIGNIFICANT CHANGE UP (ref 4.2–5.8)
RBC # FLD: 12.9 % — SIGNIFICANT CHANGE UP (ref 10.3–14.5)
SODIUM SERPL-SCNC: 138 MMOL/L — SIGNIFICANT CHANGE UP (ref 135–145)
TROPONIN I SERPL-MCNC: <0.015 NG/ML — SIGNIFICANT CHANGE UP (ref 0–0.04)
WBC # BLD: 11 K/UL — HIGH (ref 3.8–10.5)
WBC # FLD AUTO: 11 K/UL — HIGH (ref 3.8–10.5)

## 2018-10-12 PROCEDURE — 71046 X-RAY EXAM CHEST 2 VIEWS: CPT | Mod: 26

## 2018-10-12 PROCEDURE — 99285 EMERGENCY DEPT VISIT HI MDM: CPT

## 2018-10-12 RX ORDER — ASPIRIN/CALCIUM CARB/MAGNESIUM 324 MG
325 TABLET ORAL ONCE
Qty: 0 | Refills: 0 | Status: COMPLETED | OUTPATIENT
Start: 2018-10-12 | End: 2018-10-12

## 2018-10-12 RX ADMIN — Medication 325 MILLIGRAM(S): at 23:34

## 2018-10-12 NOTE — ED PROVIDER NOTE - OBJECTIVE STATEMENT
77 y/o M with PMHx of HTN, HLD, CAD and PSHx of CABG, coronary stent placement presents to the ED with complaints of squeezing chest pain x 2 days. Chest pain is associated with shortness of breath. Patient states symptoms are improved with Nitroglycerin. Patient took earlier and is now without chest pain. Denies nausea, vomiting, diaphoresis or any other complaints. Allergies: Shellfish, Pork, Eggs.

## 2018-10-13 DIAGNOSIS — E78.5 HYPERLIPIDEMIA, UNSPECIFIED: ICD-10-CM

## 2018-10-13 DIAGNOSIS — R07.9 CHEST PAIN, UNSPECIFIED: ICD-10-CM

## 2018-10-13 DIAGNOSIS — I10 ESSENTIAL (PRIMARY) HYPERTENSION: ICD-10-CM

## 2018-10-13 DIAGNOSIS — Z29.9 ENCOUNTER FOR PROPHYLACTIC MEASURES, UNSPECIFIED: ICD-10-CM

## 2018-10-13 PROBLEM — I25.10 ATHEROSCLEROTIC HEART DISEASE OF NATIVE CORONARY ARTERY WITHOUT ANGINA PECTORIS: Chronic | Status: ACTIVE | Noted: 2018-04-12

## 2018-10-13 LAB
ANION GAP SERPL CALC-SCNC: 8 MMOL/L — SIGNIFICANT CHANGE UP (ref 5–17)
BASOPHILS # BLD AUTO: 0.2 K/UL — SIGNIFICANT CHANGE UP (ref 0–0.2)
BASOPHILS NFR BLD AUTO: 1.4 % — SIGNIFICANT CHANGE UP (ref 0–2)
BUN SERPL-MCNC: 22 MG/DL — HIGH (ref 7–18)
CALCIUM SERPL-MCNC: 9.7 MG/DL — SIGNIFICANT CHANGE UP (ref 8.4–10.5)
CHLORIDE SERPL-SCNC: 108 MMOL/L — SIGNIFICANT CHANGE UP (ref 96–108)
CHOLEST SERPL-MCNC: 259 MG/DL — HIGH (ref 10–199)
CO2 SERPL-SCNC: 23 MMOL/L — SIGNIFICANT CHANGE UP (ref 22–31)
CREAT SERPL-MCNC: 1.21 MG/DL — SIGNIFICANT CHANGE UP (ref 0.5–1.3)
EOSINOPHIL # BLD AUTO: 0.4 K/UL — SIGNIFICANT CHANGE UP (ref 0–0.5)
EOSINOPHIL NFR BLD AUTO: 3.4 % — SIGNIFICANT CHANGE UP (ref 0–6)
GLUCOSE SERPL-MCNC: 87 MG/DL — SIGNIFICANT CHANGE UP (ref 70–99)
HBA1C BLD-MCNC: 6.2 % — HIGH (ref 4–5.6)
HCT VFR BLD CALC: 41.5 % — SIGNIFICANT CHANGE UP (ref 39–50)
HDLC SERPL-MCNC: 47 MG/DL — SIGNIFICANT CHANGE UP
HGB BLD-MCNC: 13.3 G/DL — SIGNIFICANT CHANGE UP (ref 13–17)
LIPID PNL WITH DIRECT LDL SERPL: 184 MG/DL — SIGNIFICANT CHANGE UP
LYMPHOCYTES # BLD AUTO: 37 % — SIGNIFICANT CHANGE UP (ref 13–44)
LYMPHOCYTES # BLD AUTO: 4.1 K/UL — HIGH (ref 1–3.3)
MCHC RBC-ENTMCNC: 29.6 PG — SIGNIFICANT CHANGE UP (ref 27–34)
MCHC RBC-ENTMCNC: 32 GM/DL — SIGNIFICANT CHANGE UP (ref 32–36)
MCV RBC AUTO: 92.5 FL — SIGNIFICANT CHANGE UP (ref 80–100)
MONOCYTES # BLD AUTO: 0.7 K/UL — SIGNIFICANT CHANGE UP (ref 0–0.9)
MONOCYTES NFR BLD AUTO: 6.3 % — SIGNIFICANT CHANGE UP (ref 2–14)
NEUTROPHILS # BLD AUTO: 5.7 K/UL — SIGNIFICANT CHANGE UP (ref 1.8–7.4)
NEUTROPHILS NFR BLD AUTO: 51.8 % — SIGNIFICANT CHANGE UP (ref 43–77)
PLATELET # BLD AUTO: 222 K/UL — SIGNIFICANT CHANGE UP (ref 150–400)
POTASSIUM SERPL-MCNC: 4.2 MMOL/L — SIGNIFICANT CHANGE UP (ref 3.5–5.3)
POTASSIUM SERPL-SCNC: 4.2 MMOL/L — SIGNIFICANT CHANGE UP (ref 3.5–5.3)
RBC # BLD: 4.49 M/UL — SIGNIFICANT CHANGE UP (ref 4.2–5.8)
RBC # FLD: 12.7 % — SIGNIFICANT CHANGE UP (ref 10.3–14.5)
SODIUM SERPL-SCNC: 139 MMOL/L — SIGNIFICANT CHANGE UP (ref 135–145)
TOTAL CHOLESTEROL/HDL RATIO MEASUREMENT: 5.5 RATIO — SIGNIFICANT CHANGE UP (ref 3.4–9.6)
TRIGL SERPL-MCNC: 138 MG/DL — SIGNIFICANT CHANGE UP (ref 10–149)
TROPONIN I SERPL-MCNC: <0.015 NG/ML — SIGNIFICANT CHANGE UP (ref 0–0.04)
TROPONIN I SERPL-MCNC: <0.015 NG/ML — SIGNIFICANT CHANGE UP (ref 0–0.04)
TSH SERPL-MCNC: 1.96 UU/ML — SIGNIFICANT CHANGE UP (ref 0.34–4.82)
WBC # BLD: 11.1 K/UL — HIGH (ref 3.8–10.5)
WBC # FLD AUTO: 11.1 K/UL — HIGH (ref 3.8–10.5)

## 2018-10-13 RX ORDER — TICAGRELOR 90 MG/1
90 TABLET ORAL
Qty: 0 | Refills: 0 | Status: DISCONTINUED | OUTPATIENT
Start: 2018-10-13 | End: 2018-10-16

## 2018-10-13 RX ORDER — ISOSORBIDE MONONITRATE 60 MG/1
30 TABLET, EXTENDED RELEASE ORAL DAILY
Qty: 0 | Refills: 0 | Status: DISCONTINUED | OUTPATIENT
Start: 2018-10-13 | End: 2018-10-16

## 2018-10-13 RX ORDER — ATORVASTATIN CALCIUM 80 MG/1
80 TABLET, FILM COATED ORAL AT BEDTIME
Qty: 0 | Refills: 0 | Status: DISCONTINUED | OUTPATIENT
Start: 2018-10-13 | End: 2018-10-16

## 2018-10-13 RX ORDER — LISINOPRIL 2.5 MG/1
20 TABLET ORAL DAILY
Qty: 0 | Refills: 0 | Status: DISCONTINUED | OUTPATIENT
Start: 2018-10-13 | End: 2018-10-15

## 2018-10-13 RX ORDER — CHOLECALCIFEROL (VITAMIN D3) 125 MCG
1 CAPSULE ORAL
Qty: 0 | Refills: 0 | COMMUNITY

## 2018-10-13 RX ORDER — RANOLAZINE 500 MG/1
1 TABLET, FILM COATED, EXTENDED RELEASE ORAL
Qty: 0 | Refills: 0 | COMMUNITY

## 2018-10-13 RX ORDER — NITROGLYCERIN 6.5 MG
0.4 CAPSULE, EXTENDED RELEASE ORAL
Qty: 0 | Refills: 0 | Status: DISCONTINUED | OUTPATIENT
Start: 2018-10-13 | End: 2018-10-16

## 2018-10-13 RX ORDER — CARVEDILOL PHOSPHATE 80 MG/1
6.25 CAPSULE, EXTENDED RELEASE ORAL EVERY 12 HOURS
Qty: 0 | Refills: 0 | Status: DISCONTINUED | OUTPATIENT
Start: 2018-10-13 | End: 2018-10-16

## 2018-10-13 RX ORDER — ASPIRIN/CALCIUM CARB/MAGNESIUM 324 MG
81 TABLET ORAL DAILY
Qty: 0 | Refills: 0 | Status: DISCONTINUED | OUTPATIENT
Start: 2018-10-13 | End: 2018-10-16

## 2018-10-13 RX ORDER — AMLODIPINE BESYLATE 2.5 MG/1
5 TABLET ORAL DAILY
Qty: 0 | Refills: 0 | Status: DISCONTINUED | OUTPATIENT
Start: 2018-10-13 | End: 2018-10-16

## 2018-10-13 RX ADMIN — ISOSORBIDE MONONITRATE 30 MILLIGRAM(S): 60 TABLET, EXTENDED RELEASE ORAL at 11:32

## 2018-10-13 RX ADMIN — LISINOPRIL 20 MILLIGRAM(S): 2.5 TABLET ORAL at 05:45

## 2018-10-13 RX ADMIN — CARVEDILOL PHOSPHATE 6.25 MILLIGRAM(S): 80 CAPSULE, EXTENDED RELEASE ORAL at 05:45

## 2018-10-13 RX ADMIN — Medication 81 MILLIGRAM(S): at 11:32

## 2018-10-13 RX ADMIN — AMLODIPINE BESYLATE 5 MILLIGRAM(S): 2.5 TABLET ORAL at 05:45

## 2018-10-13 RX ADMIN — CARVEDILOL PHOSPHATE 6.25 MILLIGRAM(S): 80 CAPSULE, EXTENDED RELEASE ORAL at 17:50

## 2018-10-13 RX ADMIN — TICAGRELOR 90 MILLIGRAM(S): 90 TABLET ORAL at 17:50

## 2018-10-13 RX ADMIN — TICAGRELOR 90 MILLIGRAM(S): 90 TABLET ORAL at 05:45

## 2018-10-13 RX ADMIN — ATORVASTATIN CALCIUM 80 MILLIGRAM(S): 80 TABLET, FILM COATED ORAL at 21:59

## 2018-10-13 NOTE — H&P ADULT - NSHPPHYSICALEXAM_GEN_ALL_CORE
General - NAD, sitting up in bed, well groomed  Eyes - PERRLA, EOM intact  ENT - Nonicteric sclerae, PERRLA, EOMI. Oropharynx clear. Moist mucous membranes. Conjunctivae appear well perfused.   Neck - No noticeable or palpable swelling, redness or rash around throat or on face  Lymph Nodes - No lymphadenopathy  Cardiovascular - RRR no m/r/g, no JVD, no carotid bruits  Lungs - Clear to ascultation, no use of accessory muscles, no crackles or wheezes.  Skin - No rashes, skin warm and dry, no erythematous areas  Abdomen - Normal bowel sounds, abdomen soft and nontender  Extremities - No edema, cyanosis or clubbing  Musculo Skeletal - 5/5 strength, normal range of motion, no swollen or erythematous  joints.  Neurological – Alert and oriented x 3

## 2018-10-13 NOTE — CONSULT NOTE ADULT - ASSESSMENT
· Assessment		  HPI: 79 y/o M with PMHx of HTN, HLD, CAD and PSHx of CABG, coronary stent placement presents to the ED with complaints of squeezing chest pain x 2 days. Chest pain is associated with shortness of breath. Patient states symptoms are improved with Nitroglycerin. Patient took earlier and is now without chest pain.       Problem/Plan - 1:  ·  Problem: Chest pain.-ACS  Plan: -EKG showed: NSR no ST or T wave changes  Management:  - ASA, Lipitor + BB  - Check Troponins: T1: Negative check T2 and T3  Will need ischemic work-up.  TTE-LV Function      Problem/Plan - 2:  ·  Problem: Hyperlipidemia.  Plan: Patient takes at home  C/W with Statin therapy      Problem/Plan - 3:  ·  Problem: Hypertension.  Plan: -C/W BP medications  -BP WNL upon evaluation  -Continue to monitor.       Problem/Plan - 3:  ·  Problem: Hypertension.  Plan: -C/W BP medications  -BP WNL upon evaluation  -Continue to monitor.

## 2018-10-13 NOTE — CONSULT NOTE ADULT - ATTENDING COMMENTS
Thank you for the courtesy of the consultation,I would be available for any further discussion if needed.  Chauncey Gonsalez MD,FACC.  [address] [place], [place] [id number] [phone]

## 2018-10-13 NOTE — CONSULT NOTE ADULT - SUBJECTIVE AND OBJECTIVE BOX
CHIEF COMPLAINT:Chest pain    HPI: 77 y/o M with PMHx of HTN, HLD, CAD and PSHx of CABG, coronary stent placement presents to the ED with complaints of squeezing chest pain x 2 days. Chest pain is associated with shortness of breath. Patient states symptoms are improved with Nitroglycerin. Patient took earlier and is now without chest pain. Denies nausea, vomiting, diaphoresis or any other complaints.         Allergies: Shellfish, Pork, Eggs.       PAST MEDICAL & SURGICAL HISTORY:  Hyperlipidemia  Hypertension  Coronary artery disease  S/P CABG x 3      MEDICATIONS  (STANDING):  amLODIPine   Tablet 5 milliGRAM(s) Oral daily  aspirin enteric coated 81 milliGRAM(s) Oral daily  atorvastatin 80 milliGRAM(s) Oral at bedtime  carvedilol 6.25 milliGRAM(s) Oral every 12 hours  isosorbide   mononitrate ER Tablet (IMDUR) 30 milliGRAM(s) Oral daily  lisinopril 20 milliGRAM(s) Oral daily  ticagrelor 90 milliGRAM(s) Oral two times a day    MEDICATIONS  (PRN):  nitroglycerin     SubLingual 0.4 milliGRAM(s) SubLingual every 5 minutes PRN Chest Pain      FAMILY HISTORY:  Family history of MI (myocardial infarction) (Sibling)  No family history of premature coronary artery disease or sudden cardiac death    SOCIAL HISTORY:  Smoking-  Alcohol-  Ilicit Drug use-    REVIEW OF SYSTEMS:  Constitutional: [ ] fever, [ ]weight loss, [ ]fatigue Activity [ ] Bedbound,[ ] Ambulates [ ] Unassisted[ ] Cane/Walker [ ] Assistence.  Eyes: [ ] visual changes  Respiratory: [ ]shortness of breath;  [ ] cough, [ ]wheezing, [ ]chills, [ ]hemoptysis  Cardiovascular: [ ] chest pain, [ ]palpitations, [ ]dizziness,  [ ]leg swelling[ ]orthopnea [ ]PND  Gastrointestinal: [ ] abdominal pain, [ ]nausea, [ ]vomiting,  [ ]diarrhea,[ ]constipation  Genitourinary: [ ] dysuria, [ ] hematuria  Neurologic: [ ] headaches [ ] tremors[ ] weakness  Skin: [ ] itching, [ ]burning, [ ] rashes  Endocrine: [ ] heat or cold intolerance  Musculoskeletal: [ ] joint pain or swelling; [ ] muscle, back, or extremity pain  Psychiatric: [ ] depression, [ ]anxiety, [ ]mood swings, or [ ]difficulty sleeping  Hematologic: [ ] easy bruising, [ ] bleeding gums       [ x] All others negative	  [ ] Unable to obtain    Vital Signs Last 24 Hrs  · Temp (F): 97.8	  · Temp (C) Temp (C): 36.6	  · Heart Rate Heart Rate (beats/min): 67	  · Heart Rate Method: noninvasive blood pressure monitor	  · BP Systolic Systolic: 178	  · BP Diastolic Diastolic (mm Hg): 74	  · Respiration Rate (breaths/min) Respiration Rate (breaths/min): 18	  · SpO2 (%) SpO2 (%): 98	        PHYSICAL EXAM:    General: No acute distress BMI-28  HEENT: EOMI, PERRL[ ] Icteric  Neck: Supple, No JVD  Lungs: Equal air entry bilaterally; [ ] Rales [ ] Rhonchi [ ] Wheezing  Heart: Regular rate and rhythm;[x ] Murmurs-  2 /6 [x ] Systolic [ ] Diastolic [ ] Radiation,No rubs, or gallops  Abdomen: Nontender, bowel sounds present  Extremities: No clubbing, cyanosis, or edema[ ] Calf tenderness  Nervous system:  Alert & Oriented X3, no focal deficits  Psychiatric: Normal affect  Skin: No rashes or lesions      LABS;    12-Oct-2018 21:33, Basic Metabolic Panel	  Sodium, Serum: 138, [135 - 145 mmol/L]	  Potassium, Serum: 5.0, [3.5 - 5.3 mmol/L]	  Chloride, Serum: 107, [96 - 108 mmol/L]	  Carbon Dioxide, Serum: 24, [22 - 31 mmol/L]	  Anion Gap, Serum: 7, [5 - 17 mmol/L]	  Blood Urea Nitrogen, Serum:   27, [7 - 18 mg/dL]	  Creatinine, Serum: 1.22, [0.50 - 1.30 mg/dL]	  Glucose, Serum:   103, [70 - 99 mg/dL]	  Calcium, Total Serum: 9.6, [8.4 - 10.5 mg/dL]	    12-Oct-2018 21:33, Complete Blood Count + Automated Diff	  WBC Count:   11.0, [3.8 - 10.5 K/uL]	  RBC Count: 4.25, [4.20 - 5.80 M/uL]	  Hemoglobin:   12.6, [13.0 - 17.0 g/dL]	  Hematocrit: 39.1, [39.0 - 50.0 %]	  Mean Cell Volume: 92.1, [80.0 - 100.0 fl]	  Mean Cell Hemoglobin: 29.7, [27.0 - 34.0 pg]	  Mean Cell Hemoglobin Conc: 32.2, [32.0 - 36.0 gm/dL]	  Red Cell Distrib Width: 12.9, [10.3 - 14.5 %]	  Platelet Count - Automated: 214, [150 - 400 K/uL]	    Troponin I, Serum (10.13.18 @ 12:28)    Troponin I, Serum: <0.015    ECG [my interpretation]:Sinus Rhythm No acute ST T wave abnormalities    TELEMETRY:Sinus Rhythm

## 2018-10-13 NOTE — H&P ADULT - HISTORY OF PRESENT ILLNESS
HPI: 79 y/o M with PMHx of HTN, HLD, CAD and PSHx of CABG, coronary stent placement presents to the ED with complaints of squeezing chest pain x 2 days. Chest pain is associated with shortness of breath. Patient states symptoms are improved with Nitroglycerin. Patient took earlier and is now without chest pain. Denies nausea, vomiting, diaphoresis or any other complaints. Allergies: Shellfish, Pork, Eggs.

## 2018-10-13 NOTE — H&P ADULT - ASSESSMENT
HPI: 79 y/o M with PMHx of HTN, HLD, CAD and PSHx of CABG, coronary stent placement presents to the ED with complaints of squeezing chest pain x 2 days. Chest pain is associated with shortness of breath. Patient states symptoms are improved with Nitroglycerin. Patient took earlier and is now without chest pain. Denies nausea, vomiting, diaphoresis or any other complaints. Allergies: Shellfish, Pork, Eggs.    ED course: Patient examined at bedside NAD  Physical exam as above   Labs significant for Negative Troponin   Imaging significant for Negative Xray    Patient will be admitted to the floor for chest Pain R/O ACS

## 2018-10-13 NOTE — H&P ADULT - PROBLEM SELECTOR PLAN 4
IMPROVE VTE score: 1  No need for prophylactic measures    [ ] Previous VTE                                    3  [ ] Thrombophilia                                  2  [ ] Lower limb paralysis                        2  (unable to hold up >15 seconds)    [ ] Current Cancer (within 6 months)        2   [] Immobilization > 24 hrs                    1  [ ] ICU/CCU stay > 24 hrs                      1  [x] Age > 60                                         1

## 2018-10-13 NOTE — H&P ADULT - PROBLEM SELECTOR PLAN 1
-EKG showed: NSR no ST or T wave changed  Management:  - ASA, Lipitor + BB  - Check Troponins: T1: Negative check T2 and T3  - Telemetry monitoring  - TTE ordered  - Cardiology consult Dr Hunter -EKG showed: NSR no ST or T wave changed  Management:  - ASA, Lipitor + BB  - Check Troponins: T1: Negative check T2 and T3  - Telemetry monitoring  - TTE ordered  - Cardiology consult Dr Gonsalez

## 2018-10-14 LAB
ANION GAP SERPL CALC-SCNC: 4 MMOL/L — LOW (ref 5–17)
BASOPHILS # BLD AUTO: 0.1 K/UL — SIGNIFICANT CHANGE UP (ref 0–0.2)
BASOPHILS NFR BLD AUTO: 1.2 % — SIGNIFICANT CHANGE UP (ref 0–2)
BUN SERPL-MCNC: 24 MG/DL — HIGH (ref 7–18)
CALCIUM SERPL-MCNC: 9.7 MG/DL — SIGNIFICANT CHANGE UP (ref 8.4–10.5)
CHLORIDE SERPL-SCNC: 104 MMOL/L — SIGNIFICANT CHANGE UP (ref 96–108)
CO2 SERPL-SCNC: 30 MMOL/L — SIGNIFICANT CHANGE UP (ref 22–31)
CREAT SERPL-MCNC: 1.38 MG/DL — HIGH (ref 0.5–1.3)
EOSINOPHIL # BLD AUTO: 0.4 K/UL — SIGNIFICANT CHANGE UP (ref 0–0.5)
EOSINOPHIL NFR BLD AUTO: 4 % — SIGNIFICANT CHANGE UP (ref 0–6)
GLUCOSE SERPL-MCNC: 119 MG/DL — HIGH (ref 70–99)
HCT VFR BLD CALC: 42.2 % — SIGNIFICANT CHANGE UP (ref 39–50)
HGB BLD-MCNC: 13.6 G/DL — SIGNIFICANT CHANGE UP (ref 13–17)
LYMPHOCYTES # BLD AUTO: 3.4 K/UL — HIGH (ref 1–3.3)
LYMPHOCYTES # BLD AUTO: 31.9 % — SIGNIFICANT CHANGE UP (ref 13–44)
MCHC RBC-ENTMCNC: 30.1 PG — SIGNIFICANT CHANGE UP (ref 27–34)
MCHC RBC-ENTMCNC: 32.4 GM/DL — SIGNIFICANT CHANGE UP (ref 32–36)
MCV RBC AUTO: 93.1 FL — SIGNIFICANT CHANGE UP (ref 80–100)
MONOCYTES # BLD AUTO: 0.5 K/UL — SIGNIFICANT CHANGE UP (ref 0–0.9)
MONOCYTES NFR BLD AUTO: 4.9 % — SIGNIFICANT CHANGE UP (ref 2–14)
NEUTROPHILS # BLD AUTO: 6.2 K/UL — SIGNIFICANT CHANGE UP (ref 1.8–7.4)
NEUTROPHILS NFR BLD AUTO: 58 % — SIGNIFICANT CHANGE UP (ref 43–77)
PLATELET # BLD AUTO: 227 K/UL — SIGNIFICANT CHANGE UP (ref 150–400)
POTASSIUM SERPL-MCNC: 4.7 MMOL/L — SIGNIFICANT CHANGE UP (ref 3.5–5.3)
POTASSIUM SERPL-SCNC: 4.7 MMOL/L — SIGNIFICANT CHANGE UP (ref 3.5–5.3)
RBC # BLD: 4.53 M/UL — SIGNIFICANT CHANGE UP (ref 4.2–5.8)
RBC # FLD: 13.2 % — SIGNIFICANT CHANGE UP (ref 10.3–14.5)
SODIUM SERPL-SCNC: 138 MMOL/L — SIGNIFICANT CHANGE UP (ref 135–145)
WBC # BLD: 10.7 K/UL — HIGH (ref 3.8–10.5)
WBC # FLD AUTO: 10.7 K/UL — HIGH (ref 3.8–10.5)

## 2018-10-14 RX ADMIN — Medication 81 MILLIGRAM(S): at 11:23

## 2018-10-14 RX ADMIN — CARVEDILOL PHOSPHATE 6.25 MILLIGRAM(S): 80 CAPSULE, EXTENDED RELEASE ORAL at 05:34

## 2018-10-14 RX ADMIN — AMLODIPINE BESYLATE 5 MILLIGRAM(S): 2.5 TABLET ORAL at 05:34

## 2018-10-14 RX ADMIN — TICAGRELOR 90 MILLIGRAM(S): 90 TABLET ORAL at 17:39

## 2018-10-14 RX ADMIN — CARVEDILOL PHOSPHATE 6.25 MILLIGRAM(S): 80 CAPSULE, EXTENDED RELEASE ORAL at 17:39

## 2018-10-14 RX ADMIN — TICAGRELOR 90 MILLIGRAM(S): 90 TABLET ORAL at 05:34

## 2018-10-14 RX ADMIN — ISOSORBIDE MONONITRATE 30 MILLIGRAM(S): 60 TABLET, EXTENDED RELEASE ORAL at 11:22

## 2018-10-14 RX ADMIN — LISINOPRIL 20 MILLIGRAM(S): 2.5 TABLET ORAL at 05:34

## 2018-10-14 RX ADMIN — ATORVASTATIN CALCIUM 80 MILLIGRAM(S): 80 TABLET, FILM COATED ORAL at 21:06

## 2018-10-14 NOTE — DISCHARGE NOTE ADULT - PLAN OF CARE
Rule out ACS You presented with chest pain and with past medical history of CAD, CABG X 3, Htn and Hlp, you were admitted for ACS rule out. EKG showed no ST segment or T wave changes. Pt admitted to telemetry floor and cardiac enzymes were followed. 3 set of cardiac troponins were negative. Telemetery monitoring showed no significant change. Pt symptoms resolved. Cardiology was consulted and they recommended stress test for patient. Stress test is done on 10/15./2018 and it showed     Pt is stable for discharge per primary attending. HLP control You were on atorvastatin 80mg at home. We continued atorvastatin 80 mg home dose in hospital and LDL was 47. You will continue taking it as same dose. HTN control You have history of HTN for which you take lisinopril and amlodipine at home. You will continue taking these medications. Symptom control You will continue taking home dose of Coreg and aspirin as mentioned in discharge medication section. You presented with chest pain and with past medical history of CAD, CABG X 3, Htn and Hlp, you were admitted for ACS rule out. EKG showed no ST segment or T wave changes. Pt admitted to telemetry floor and cardiac enzymes were followed. 3 set of cardiac troponins were negative. Telemetery monitoring showed no significant change. Pt symptoms resolved. Cardiology was consulted and they recommended stress test for patient. Stress test is done on 10/15./2018 and it showed  ischemia.  Pt to be transferred to cath lab facility,

## 2018-10-14 NOTE — DISCHARGE NOTE ADULT - CARE PLAN
Principal Discharge DX:	Chest pain  Goal:	Rule out ACS  Assessment and plan of treatment:	You presented with chest pain and with past medical history of CAD, CABG X 3, Htn and Hlp, you were admitted for ACS rule out. EKG showed no ST segment or T wave changes. Pt admitted to telemetry floor and cardiac enzymes were followed. 3 set of cardiac troponins were negative. Telemetery monitoring showed no significant change. Pt symptoms resolved. Cardiology was consulted and they recommended stress test for patient. Stress test is done on 10/15./2018 and it showed     Pt is stable for discharge per primary attending.  Secondary Diagnosis:	Hyperlipidemia  Goal:	HLP control  Assessment and plan of treatment:	You were on atorvastatin 80mg at home. We continued atorvastatin 80 mg home dose in hospital and LDL was 47. You will continue taking it as same dose.  Secondary Diagnosis:	Hypertension  Goal:	HTN control  Assessment and plan of treatment:	You have history of HTN for which you take lisinopril and amlodipine at home. You will continue taking these medications.  Secondary Diagnosis:	Coronary artery disease  Goal:	Symptom control  Assessment and plan of treatment:	You will continue taking home dose of Coreg and aspirin as mentioned in discharge medication section. Principal Discharge DX:	Chest pain  Goal:	Rule out ACS  Assessment and plan of treatment:	You presented with chest pain and with past medical history of CAD, CABG X 3, Htn and Hlp, you were admitted for ACS rule out. EKG showed no ST segment or T wave changes. Pt admitted to telemetry floor and cardiac enzymes were followed. 3 set of cardiac troponins were negative. Telemetery monitoring showed no significant change. Pt symptoms resolved. Cardiology was consulted and they recommended stress test for patient. Stress test is done on 10/15./2018 and it showed  ischemia.  Pt to be transferred to cath lab facility,  Secondary Diagnosis:	Hyperlipidemia  Goal:	HLP control  Assessment and plan of treatment:	You were on atorvastatin 80mg at home. We continued atorvastatin 80 mg home dose in hospital and LDL was 47. You will continue taking it as same dose.  Secondary Diagnosis:	Hypertension  Goal:	HTN control  Assessment and plan of treatment:	You have history of HTN for which you take lisinopril and amlodipine at home. You will continue taking these medications.  Secondary Diagnosis:	Coronary artery disease  Goal:	Symptom control  Assessment and plan of treatment:	You will continue taking home dose of Coreg and aspirin as mentioned in discharge medication section.

## 2018-10-14 NOTE — PROGRESS NOTE ADULT - ATTENDING COMMENTS
Thank you for the courtesy of the consultation,I would be available for any further discussion if needed.  Chauncey Gonsalez MD,FACC.  [address] [place], [place] [id number] [phone]

## 2018-10-14 NOTE — PROGRESS NOTE ADULT - PROBLEM SELECTOR PLAN 1
-EKG showed: NSR no ST or T wave changed  Management:  - ASA, Lipitor + BB  - Check Troponins: T1, T2 and T3 are negative.  - Telemetry monitoring  - TTE ordered  - Cardiology consult Dr Gonsalez appreciated, Stress test in AM.

## 2018-10-14 NOTE — DISCHARGE NOTE ADULT - HOSPITAL COURSE
77 y/o M with PMHx of HTN, HLD, CAD and PSHx of CABG, coronary stent placement presents to the ED with complaints of squeezing chest pain x 2 days. Chest pain is associated with shortness of breath. Patient states symptoms are improved with Nitroglycerin. Patient took earlier and is now without chest pain. Denies nausea, vomiting, diaphoresis or any other complaints. Allergies: Shellfish, Pork, Eggs.   Pt presented with chest pain and with past medical history of CAD, CABG X 3, Htn and Hlp, admitted for ACS rule out. EKG showed no ST segment or T wave changes. Pt admitted to telemetry floor and cardiac enzymes were followed. 3 set of cardiac troponins were negative. Telemetery monitoring showed no significant change. Pt symptoms resolved. Cardiology was consulted and they recommended stress test for patient. Stress test is done on 10/15./2018 and it showed     Pt is stable for discharge per primary attending. 79 y/o M with PMHx of HTN, HLD, CAD and PSHx of CABG, coronary stent placement presents to the ED with complaints of squeezing chest pain x 2 days. Chest pain is associated with shortness of breath. Patient states symptoms are improved with Nitroglycerin. Patient took earlier and is now without chest pain. Denies nausea, vomiting, diaphoresis or any other complaints. Allergies: Shellfish, Pork, Eggs.   Pt presented with chest pain and with past medical history of CAD, CABG X 3, Htn and Hlp, admitted for ACS rule out. EKG showed no ST segment or T wave changes. Pt admitted to telemetry floor and cardiac enzymes were followed. 3 set of cardiac troponins were negative. Telemetery monitoring showed no significant change. Pt symptoms resolved. Cardiology was consulted and they recommended stress test for patient. Stress test is done on 10/15./2018 and it showed ischemia.   Pt to be discharged to cardiac catheterization facility.

## 2018-10-14 NOTE — DISCHARGE NOTE ADULT - MEDICATION SUMMARY - MEDICATIONS TO TAKE
I will START or STAY ON the medications listed below when I get home from the hospital:    Aspirin Enteric Coated 81 mg oral delayed release tablet  -- 1 tab(s) by mouth once a day  -- Indication: For Cad    Zestril 20 mg oral tablet  -- 1 tab(s) by mouth once a day  -- Indication: For Htn    nitroglycerin 0.4 mg sublingual tablet  -- 1 tab(s) under tongue every 5 minutes, As Needed - for chest pain Maximum Up To Three Doses    Dispense: 1 vial   -- Indication: For Cad    Imdur 30 mg oral tablet, extended release  -- 1 tab(s) by mouth once a day (in the morning)  -- Indication: For angina    Lipitor 80 mg oral tablet  -- 1 tab(s) by mouth once a day (at bedtime)  -- Indication: For Hlp    Brilinta (ticagrelor) 90 mg oral tablet  -- 1 tab(s) by mouth 2 times a day  -- Indication: For Cad    Coreg 6.25 mg oral tablet  -- 1 tab(s) by mouth 2 times a day  -- Indication: For Hypertension    amLODIPine 5 mg oral tablet  -- 1 tab(s) by mouth once a day  -- Indication: For Htn    Protonix 40 mg oral delayed release tablet  -- 1 tab(s) by mouth once a day  -- Indication: For antiacid

## 2018-10-14 NOTE — PROGRESS NOTE ADULT - SUBJECTIVE AND OBJECTIVE BOX
PGY1 Note discussed with supervising resident and primary attending.    Patient is a 78y old  Male who presents with a chief complaint of Chest pain (13 Oct 2018 01:35)      INTERVAL HPI/OVERNIGHT EVENTS: Cardiology consult appreciated. Pt to have stress test in AM tomorrow. 3 set of cardiac enzymes are negative.  Pt is in NAD.       MEDICATIONS  (STANDING):  amLODIPine   Tablet 5 milliGRAM(s) Oral daily  aspirin enteric coated 81 milliGRAM(s) Oral daily  atorvastatin 80 milliGRAM(s) Oral at bedtime  carvedilol 6.25 milliGRAM(s) Oral every 12 hours  isosorbide   mononitrate ER Tablet (IMDUR) 30 milliGRAM(s) Oral daily  lisinopril 20 milliGRAM(s) Oral daily  ticagrelor 90 milliGRAM(s) Oral two times a day    MEDICATIONS  (PRN):  nitroglycerin     SubLingual 0.4 milliGRAM(s) SubLingual every 5 minutes PRN Chest Pain      Allergies    eggs (Pruritus)  No Known Drug Allergies  pork (Unknown)  shellfish (Hives)    Intolerances        REVIEW OF SYSTEMS:  CONSTITUTIONAL: No fever, weight loss, or fatigue  RESPIRATORY: No cough, wheezing, chills or hemoptysis; No shortness of breath  CARDIOVASCULAR: No chest pain, palpitations, dizziness, or leg swelling  GASTROINTESTINAL: No abdominal or epigastric pain. No nausea, vomiting, or hematemesis; No diarrhea or constipation. No melena or hematochezia.  NEUROLOGICAL: No headaches, memory loss, loss of strength, numbness, or tremors  SKIN: No itching, burning, rashes, or lesions     Vital Signs Last 24 Hrs  T(C): 36.4 (14 Oct 2018 11:15), Max: 36.8 (13 Oct 2018 19:56)  T(F): 97.5 (14 Oct 2018 11:15), Max: 98.3 (13 Oct 2018 23:30)  HR: 60 (14 Oct 2018 11:15) (60 - 70)  BP: 110/48 (14 Oct 2018 11:15) (97/41 - 145/-)  BP(mean): --  RR: 18 (14 Oct 2018 11:15) (17 - 19)  SpO2: 97% (14 Oct 2018 11:15) (96% - 99%)    PHYSICAL EXAM:  GENERAL: NAD, well-groomed, well-developed  HEAD:  Atraumatic, Normocephalic  EYES: EOMI, PERRLA, conjunctiva and sclera clear  NECK: Supple, No JVD, Normal thyroid  CHEST/LUNG: Clear to percussion bilaterally; No rales, rhonchi, wheezing, or rubs  HEART: Regular rate and rhythm; No murmurs, rubs, or gallops  ABDOMEN: Soft, Nontender, Nondistended; Bowel sounds present  NERVOUS SYSTEM:  Alert & Oriented X3, Good concentration; Motor Strength 5/5 B/L   EXTREMITIES:  2+ Peripheral Pulses, No clubbing, cyanosis, or edema  SKIN;    LABS:                        13.6   10.7  )-----------( 227      ( 14 Oct 2018 08:52 )             42.2     10-14    138  |  104  |  24<H>  ----------------------------<  119<H>  4.7   |  30  |  1.38<H>    Ca    9.7      14 Oct 2018 08:52          CAPILLARY BLOOD GLUCOSE          RADIOLOGY & ADDITIONAL TESTS: < from: Xray Chest 2 Views PA/Lat (10.12.18 @ 21:57) >    EXAM:  XR CHEST PA LAT 2V                            PROCEDURE DATE:  10/12/2018          INTERPRETATION:  PA and lateral chest on October 12, 2018 at 9:55 PM.   Patient has chest pain.    COMPARISON: None available.    Sternotomy is noted. There is a coronary stent. Heart size is within   normal limits.    On the lateral view there is some linear scarring versus atelectasis mild   in degree posteriorly at one of the lung bases.    IMPRESSION: Mild linear scarring versus atelectasis at one of the lung   bases. Sternotomy and coronary stent.    < end of copied text >      Imaging Personally Reviewed:  [x] YES  [ ] NO    Consultant(s) Notes Reviewed:  [x] YES  [ ] NO

## 2018-10-14 NOTE — PROGRESS NOTE ADULT - SUBJECTIVE AND OBJECTIVE BOX
PRESENTING CC:Chest pain    SUBJ:   77 y/o M with PMHx of HTN, HLD, CAD and PSHx of CABG, coronary stent placement presents to the ED with complaints of squeezing chest pain x 2 days. Remians chest pain free-no evidence for recent cardiac injury,no overnight events    PMH -reviewed admission note, no change since admission  Heart failure: acute [ ] chronic [ ] acute or chronic [ ] diastolic [ ] systolic [ ] combined systolic and diastolic[ ]  COOPER: ATN[ ] renal medullary necrosis [ ] CKD I [ ]CKDII [ ]CKD III [ ]CKD IV [ ]CKD V [ ]Other pathological lesions [ ]    MEDICATIONS  (STANDING):  amLODIPine   Tablet 5 milliGRAM(s) Oral daily  aspirin enteric coated 81 milliGRAM(s) Oral daily  atorvastatin 80 milliGRAM(s) Oral at bedtime  carvedilol 6.25 milliGRAM(s) Oral every 12 hours  isosorbide   mononitrate ER Tablet (IMDUR) 30 milliGRAM(s) Oral daily  lisinopril 20 milliGRAM(s) Oral daily  ticagrelor 90 milliGRAM(s) Oral two times a day    MEDICATIONS  (PRN):  nitroglycerin     SubLingual 0.4 milliGRAM(s) SubLingual every 5 minutes PRN Chest Pain          FAMILY HISTORY:  Family history of MI (myocardial infarction) (Sibling)  No family history of premature coronary artery disease or sudden cardiac death      REVIEW OF SYSTEMS:  Constitutional: [ ] fever, [ ]weight loss,  [ ]fatigue  Eyes: [ ] visual changes  Respiratory: [ ]shortness of breath;  [ ] cough, [ ]wheezing, [ ]chills, [ ]hemoptysis  Cardiovascular: [ ] chest pain, [ ]palpitations, [ ]dizziness,  [ ]leg swelling[ ]orthopnea[ ]PND  Gastrointestinal: [ ] abdominal pain, [ ]nausea, [ ]vomiting,  [ ]diarrhea   Genitourinary: [ ] dysuria, [ ] hematuria  Neurologic: [ ] headaches [ ] tremors[ ]weakness  Skin: [ ] itching, [ ]burning, [ ] rashes  Endocrine: [ ] heat or cold intolerance  Musculoskeletal: [ ] joint pain or swelling; [ ] muscle, back, or extremity pain  Psychiatric: [ ] depression, [ ]anxiety, [ ]mood swings, or [ ]difficulty sleeping  Hematologic: [ ] easy bruising, [ ] bleeding gums    [x] All remaining systems negative except as per above.   [ ]Unable to obtain.    Vital Signs Last 24 Hrs  T(C): 36.6 (14 Oct 2018 15:23), Max: 36.8 (13 Oct 2018 23:30)  T(F): 97.9 (14 Oct 2018 15:23), Max: 98.3 (13 Oct 2018 23:30)  HR: 60 (14 Oct 2018 15:23) (60 - 70)  BP: 118/58 (14 Oct 2018 15:23) (97/41 - 145/-)  RR: 18 (14 Oct 2018 15:23) (17 - 18)  SpO2: 98% (14 Oct 2018 15:23) (96% - 99%)  I&O's Summary    13 Oct 2018 07:01  -  14 Oct 2018 07:00  --------------------------------------------------------  IN: 511 mL / OUT: 0 mL / NET: 511 mL    14 Oct 2018 07:01  -  14 Oct 2018 20:51  --------------------------------------------------------  IN: 200 mL / OUT: 0 mL / NET: 200 mL        PHYSICAL EXAM:  General: No acute distress BMI-28  HEENT: EOMI, PERRL  Neck: Supple, [ ] JVD  Lungs: Equal air entry bilaterally; [ ] rales [ ] wheezing [ ] rhonchi  Heart: Regular rate and rhythm; [x ] murmur  2 /6 [x ] systolic [ ] diastolic [ ] radiation[ ] rubs [ ]  gallops  Abdomen: Nontender, bowel sounds present  Extremities: No clubbing, cyanosis, [ ] edema  Nervous system:  Alert & Oriented X3, no focal deficits  Psychiatric: Normal affect  Skin: No rashes or lesions    LABS:  10-14    138  |  104  |  24<H>  ----------------------------<  119<H>  4.7   |  30  |  1.38<H>    Ca    9.7      14 Oct 2018 08:52      Creatinine Trend: 1.38<--, 1.21<--, 1.22<--                        13.6   10.7  )-----------( 227      ( 14 Oct 2018 08:52 )             42.2       Lipid Panel:   Cardiac Enzymes: CARDIAC MARKERS ( 13 Oct 2018 12:28 )  <0.015 ng/mL / x     / x     / x     / x      CARDIAC MARKERS ( 13 Oct 2018 09:31 )  <0.015 ng/mL / x     / x     / x     / x      CARDIAC MARKERS ( 12 Oct 2018 21:33 )  <0.015 ng/mL / x     / 143 U/L / x     / <1.0       ECHO:Pending          IMPRESSION AND PLAN:      HPI: 77 y/o M with PMHx of HTN, HLD, CAD and PSHx of CABG, coronary stent placement presents to the ED with complaints of squeezing chest pain x 2 days. Chest pain is associated with shortness of breath. Patient states symptoms are improved with Nitroglycerin. Patient took earlier and is now without chest pain.       Problem/Plan - 1:  ·  Problem: Chest pain.-ACS  Plan: -EKG showed: NSR no ST or T wave changes  Management:  - DAPT,Lipitor + BB  - No evidence for recent cadiac injury  Will need ischemic work-up-NST In AM  TTE-LV Function      Problem/Plan - 2:  ·  Problem: Hyperlipidemia.  Plan: Patient takes at home  C/W with Statin therapy

## 2018-10-14 NOTE — PROGRESS NOTE ADULT - ASSESSMENT
HPI: 77 y/o M with PMHx of HTN, HLD, CAD and PSHx of CABG, coronary stent placement presents to the ED with complaints of squeezing chest pain x 2 days. Chest pain is associated with shortness of breath. Patient states symptoms are improved with Nitroglycerin. Patient took earlier and is now without chest pain. Denies nausea, vomiting, diaphoresis or any other complaints. Allergies: Shellfish, Pork, Eggs.    ED course: Patient examined at bedside NAD  Physical exam as above   Labs significant for Negative Troponin   Imaging significant for Negative Xray    Patient will be admitted to the floor for chest Pain R/O ACS

## 2018-10-14 NOTE — DISCHARGE NOTE ADULT - PATIENT PORTAL LINK FT
You can access the [facility] Patient Portal, offered by [facility], by registering with the following website: [email or web address]

## 2018-10-15 LAB
ANION GAP SERPL CALC-SCNC: 8 MMOL/L — SIGNIFICANT CHANGE UP (ref 5–17)
BASOPHILS # BLD AUTO: 0.1 K/UL — SIGNIFICANT CHANGE UP (ref 0–0.2)
BASOPHILS NFR BLD AUTO: 1.2 % — SIGNIFICANT CHANGE UP (ref 0–2)
BUN SERPL-MCNC: 34 MG/DL — HIGH (ref 7–18)
CALCIUM SERPL-MCNC: 9.4 MG/DL — SIGNIFICANT CHANGE UP (ref 8.4–10.5)
CHLORIDE SERPL-SCNC: 106 MMOL/L — SIGNIFICANT CHANGE UP (ref 96–108)
CO2 SERPL-SCNC: 25 MMOL/L — SIGNIFICANT CHANGE UP (ref 22–31)
CREAT SERPL-MCNC: 1.52 MG/DL — HIGH (ref 0.5–1.3)
EOSINOPHIL # BLD AUTO: 0.4 K/UL — SIGNIFICANT CHANGE UP (ref 0–0.5)
EOSINOPHIL NFR BLD AUTO: 4 % — SIGNIFICANT CHANGE UP (ref 0–6)
GLUCOSE SERPL-MCNC: 94 MG/DL — SIGNIFICANT CHANGE UP (ref 70–99)
HCT VFR BLD CALC: 44 % — SIGNIFICANT CHANGE UP (ref 39–50)
HGB BLD-MCNC: 14.1 G/DL — SIGNIFICANT CHANGE UP (ref 13–17)
LYMPHOCYTES # BLD AUTO: 3.3 K/UL — SIGNIFICANT CHANGE UP (ref 1–3.3)
LYMPHOCYTES # BLD AUTO: 32.3 % — SIGNIFICANT CHANGE UP (ref 13–44)
MCHC RBC-ENTMCNC: 29.5 PG — SIGNIFICANT CHANGE UP (ref 27–34)
MCHC RBC-ENTMCNC: 32.1 GM/DL — SIGNIFICANT CHANGE UP (ref 32–36)
MCV RBC AUTO: 91.9 FL — SIGNIFICANT CHANGE UP (ref 80–100)
MONOCYTES # BLD AUTO: 0.8 K/UL — SIGNIFICANT CHANGE UP (ref 0–0.9)
MONOCYTES NFR BLD AUTO: 7.7 % — SIGNIFICANT CHANGE UP (ref 2–14)
NEUTROPHILS # BLD AUTO: 5.6 K/UL — SIGNIFICANT CHANGE UP (ref 1.8–7.4)
NEUTROPHILS NFR BLD AUTO: 54.7 % — SIGNIFICANT CHANGE UP (ref 43–77)
PLATELET # BLD AUTO: 227 K/UL — SIGNIFICANT CHANGE UP (ref 150–400)
POTASSIUM SERPL-MCNC: 4.4 MMOL/L — SIGNIFICANT CHANGE UP (ref 3.5–5.3)
POTASSIUM SERPL-SCNC: 4.4 MMOL/L — SIGNIFICANT CHANGE UP (ref 3.5–5.3)
RBC # BLD: 4.79 M/UL — SIGNIFICANT CHANGE UP (ref 4.2–5.8)
RBC # FLD: 13.2 % — SIGNIFICANT CHANGE UP (ref 10.3–14.5)
SODIUM SERPL-SCNC: 139 MMOL/L — SIGNIFICANT CHANGE UP (ref 135–145)
WBC # BLD: 10.2 K/UL — SIGNIFICANT CHANGE UP (ref 3.8–10.5)
WBC # FLD AUTO: 10.2 K/UL — SIGNIFICANT CHANGE UP (ref 3.8–10.5)

## 2018-10-15 PROCEDURE — 93018 CV STRESS TEST I&R ONLY: CPT

## 2018-10-15 PROCEDURE — 78452 HT MUSCLE IMAGE SPECT MULT: CPT | Mod: 26

## 2018-10-15 PROCEDURE — 93016 CV STRESS TEST SUPVJ ONLY: CPT

## 2018-10-15 RX ORDER — SODIUM CHLORIDE 9 MG/ML
1000 INJECTION INTRAMUSCULAR; INTRAVENOUS; SUBCUTANEOUS
Qty: 0 | Refills: 0 | Status: DISCONTINUED | OUTPATIENT
Start: 2018-10-15 | End: 2018-10-16

## 2018-10-15 RX ADMIN — AMLODIPINE BESYLATE 5 MILLIGRAM(S): 2.5 TABLET ORAL at 05:31

## 2018-10-15 RX ADMIN — LISINOPRIL 20 MILLIGRAM(S): 2.5 TABLET ORAL at 05:31

## 2018-10-15 RX ADMIN — SODIUM CHLORIDE 75 MILLILITER(S): 9 INJECTION INTRAMUSCULAR; INTRAVENOUS; SUBCUTANEOUS at 21:31

## 2018-10-15 RX ADMIN — Medication 81 MILLIGRAM(S): at 14:46

## 2018-10-15 RX ADMIN — TICAGRELOR 90 MILLIGRAM(S): 90 TABLET ORAL at 05:31

## 2018-10-15 RX ADMIN — CARVEDILOL PHOSPHATE 6.25 MILLIGRAM(S): 80 CAPSULE, EXTENDED RELEASE ORAL at 17:33

## 2018-10-15 RX ADMIN — TICAGRELOR 90 MILLIGRAM(S): 90 TABLET ORAL at 17:33

## 2018-10-15 RX ADMIN — ISOSORBIDE MONONITRATE 30 MILLIGRAM(S): 60 TABLET, EXTENDED RELEASE ORAL at 14:47

## 2018-10-15 RX ADMIN — ATORVASTATIN CALCIUM 80 MILLIGRAM(S): 80 TABLET, FILM COATED ORAL at 21:31

## 2018-10-15 NOTE — PROGRESS NOTE ADULT - PROBLEM SELECTOR PLAN 1
-EKG showed: NSR no ST or T wave changed  Management:  - ASA, Lipitor + BB  - Check Troponins: T1, T2 and T3 are negative.  - Telemetry monitoring  - TTE ordered  - Cardiology consult Dr Gonsalez appreciated,   - Nuclear stress test showed Moderate ischemia and positive stress test. Spoke with cardiologist. Pt to be transferred tomorrow for cardiac catheterization.

## 2018-10-15 NOTE — PROGRESS NOTE ADULT - SUBJECTIVE AND OBJECTIVE BOX
PGY1 Note discussed with supervising resident and primary attending.    Patient is a 78y old  Male who presents with a chief complaint of Chest pain (14 Oct 2018 20:51)      INTERVAL HPI/OVERNIGHT EVENTS: Nuclear stress test showed Moderate ischemia and positive stress test. Spoke with cardiologist. Pt to be transferred tomorrow for cardiac catheterization.    MEDICATIONS  (STANDING):  amLODIPine   Tablet 5 milliGRAM(s) Oral daily  aspirin enteric coated 81 milliGRAM(s) Oral daily  atorvastatin 80 milliGRAM(s) Oral at bedtime  carvedilol 6.25 milliGRAM(s) Oral every 12 hours  isosorbide   mononitrate ER Tablet (IMDUR) 30 milliGRAM(s) Oral daily  lisinopril 20 milliGRAM(s) Oral daily  ticagrelor 90 milliGRAM(s) Oral two times a day    MEDICATIONS  (PRN):  nitroglycerin     SubLingual 0.4 milliGRAM(s) SubLingual every 5 minutes PRN Chest Pain      Allergies    eggs (Pruritus)  No Known Drug Allergies  pork (Unknown)  shellfish (Hives)    Intolerances        REVIEW OF SYSTEMS:  CONSTITUTIONAL: No fever, weight loss, or fatigue  RESPIRATORY: No cough, wheezing, chills or hemoptysis; No shortness of breath  CARDIOVASCULAR: No chest pain, palpitations, dizziness, or leg swelling  GASTROINTESTINAL: No abdominal or epigastric pain. No nausea, vomiting, or hematemesis; No diarrhea or constipation. No melena or hematochezia.  NEUROLOGICAL: No headaches, memory loss, loss of strength, numbness, or tremors  SKIN: No itching, burning, rashes, or lesions     Vital Signs Last 24 Hrs  T(C): 36.5 (15 Oct 2018 11:10), Max: 36.8 (14 Oct 2018 19:58)  T(F): 97.7 (15 Oct 2018 11:10), Max: 98.3 (14 Oct 2018 19:58)  HR: 63 (15 Oct 2018 11:10) (58 - 64)  BP: 109/52 (15 Oct 2018 11:10) (98/47 - 140/65)  BP(mean): --  RR: 18 (15 Oct 2018 11:10) (17 - 18)  SpO2: 97% (15 Oct 2018 11:10) (95% - 98%)    PHYSICAL EXAM:  GENERAL: NAD, well-groomed, well-developed  HEAD:  Atraumatic, Normocephalic  EYES: EOMI, PERRLA, conjunctiva and sclera clear  NECK: Supple, No JVD, Normal thyroid  CHEST/LUNG: Clear to percussion bilaterally; No rales, rhonchi, wheezing, or rubs  HEART: Regular rate and rhythm; No murmurs, rubs, or gallops  ABDOMEN: Soft, Nontender, Nondistended; Bowel sounds present  NERVOUS SYSTEM:  Alert & Oriented X3, Good concentration; Motor Strength 5/5 B/L   EXTREMITIES:  2+ Peripheral Pulses, No clubbing, cyanosis, or edema      LABS:                        14.1   10.2  )-----------( 227      ( 15 Oct 2018 08:21 )             44.0     10-15    139  |  106  |  34<H>  ----------------------------<  94  4.4   |  25  |  1.52<H>    Ca    9.4      15 Oct 2018 08:21          CAPILLARY BLOOD GLUCOSE          RADIOLOGY & ADDITIONAL TESTS: < from: Nuclear Stress Test-Pharmacologic (10.15.18 @ 12:26) >  IMPRESSIONS:  Abnormal study    * There is a medium sized, reversible, moderate to severe  intensity defect in the mid to basal inferior and  inferolateral wall, consistent with ischemia.  * Transient ischemic dilation (TID) ratio was abnormal at:  1.35  * Post-stress resting myocardial perfusion gated SPECT  imaging was performed (LVEF = 61 %;LVEDV = 80 ml.)    < end of copied text >      Imaging Personally Reviewed:  [x] YES  [ ] NO    Consultant(s) Notes Reviewed:  [x] YES  [ ] NO

## 2018-10-15 NOTE — PROGRESS NOTE ADULT - ATTENDING COMMENTS
Thank you for the courtesy of the consultation,I would be available for any further discussion if needed.  Chauncey Gonsalez MD,FACC.  [address] [place], [place] [id number] [phone]

## 2018-10-15 NOTE — PROGRESS NOTE ADULT - SUBJECTIVE AND OBJECTIVE BOX
PRESENTING CC:Chest pain    SUBJ: 77 y/o M with PMHx of HTN, HLD, CAD and PSHx of CABG, coronary stent placement presents to the ED with complaints of squeezing chest pain x 2 days. Remains chest pain free-no evidence for recent cardiac injury,no overnight events-had NST- medium sized, reversible, moderate to severe intensity defect in the mid to basal inferior and inferolateral wall, consistent with ischemia.          PMH -reviewed admission note, no change since admission  Heart failure: acute [ ] chronic [ ] acute or chronic [ ] diastolic [ ] systolic [ ] combined systolic and diastolic[ ]  COOPER: ATN[ ] renal medullary necrosis [ ] CKD I [ ]CKDII [ ]CKD III [ ]CKD IV [ ]CKD V [ ]Other pathological lesions [ ]    MEDICATIONS  (STANDING):  amLODIPine   Tablet 5 milliGRAM(s) Oral daily  aspirin enteric coated 81 milliGRAM(s) Oral daily  atorvastatin 80 milliGRAM(s) Oral at bedtime  carvedilol 6.25 milliGRAM(s) Oral every 12 hours  isosorbide   mononitrate ER Tablet (IMDUR) 30 milliGRAM(s) Oral daily  lisinopril 20 milliGRAM(s) Oral daily  ticagrelor 90 milliGRAM(s) Oral two times a day    MEDICATIONS  (PRN):  nitroglycerin     SubLingual 0.4 milliGRAM(s) SubLingual every 5 minutes PRN Chest Pain          FAMILY HISTORY:  Family history of MI (myocardial infarction) (Sibling)  No family history of premature coronary artery disease or sudden cardiac death      REVIEW OF SYSTEMS:  Constitutional: [ ] fever, [ ]weight loss,  [ ]fatigue  Eyes: [ ] visual changes  Respiratory: [ ]shortness of breath;  [ ] cough, [ ]wheezing, [ ]chills, [ ]hemoptysis  Cardiovascular: [ ] chest pain, [ ]palpitations, [ ]dizziness,  [ ]leg swelling[ ]orthopnea[ ]PND  Gastrointestinal: [ ] abdominal pain, [ ]nausea, [ ]vomiting,  [ ]diarrhea   Genitourinary: [ ] dysuria, [ ] hematuria  Neurologic: [ ] headaches [ ] tremors[ ]weakness  Skin: [ ] itching, [ ]burning, [ ] rashes  Endocrine: [ ] heat or cold intolerance  Musculoskeletal: [ ] joint pain or swelling; [ ] muscle, back, or extremity pain  Psychiatric: [ ] depression, [ ]anxiety, [ ]mood swings, or [ ]difficulty sleeping  Hematologic: [ ] easy bruising, [ ] bleeding gums    [x] All remaining systems negative except as per above.   [ ]Unable to obtain.    Vital Signs Last 24 Hrs  T(C): 36.9 (15 Oct 2018 20:14), Max: 36.9 (15 Oct 2018 20:14)  T(F): 98.4 (15 Oct 2018 20:14), Max: 98.4 (15 Oct 2018 20:14)  HR: 69 (15 Oct 2018 20:14) (58 - 72)  BP: 101/53 (15 Oct 2018 20:14) (98/47 - 131/61)  RR: 16 (15 Oct 2018 20:14) (16 - 18)  SpO2: 94% (15 Oct 2018 20:14) (94% - 100%)  I&O's Summary    14 Oct 2018 07:01  -  15 Oct 2018 07:00  --------------------------------------------------------  IN: 200 mL / OUT: 0 mL / NET: 200 mL        PHYSICAL EXAM:  General: No acute distress BMI-  HEENT: EOMI, PERRL  Neck: Supple, [ ] JVD  Lungs: Equal air entry bilaterally; [ ] rales [ ] wheezing [ ] rhonchi  Heart: Regular rate and rhythm; [x ] murmur   2/6 [x] systolic [ ] diastolic [ ] radiation[ ] rubs [ ]  gallops  Abdomen: Nontender, bowel sounds present  Extremities: No clubbing, cyanosis, [ ] edema  Nervous system:  Alert & Oriented X3, no focal deficits  Psychiatric: Normal affect  Skin: No rashes or lesions    LABS:  10-15    139  |  106  |  34<H>  ----------------------------<  94  4.4   |  25  |  1.52<H>    Ca    9.4      15 Oct 2018 08:21      Creatinine Trend: 1.52<--, 1.38<--, 1.21<--, 1.22<--                        14.1   10.2  )-----------( 227      ( 15 Oct 2018 08:21 )             44.0       Lipid Panel:   Cardiac Enzymes:               STRESS TEST:  There is a medium sized, reversible, moderate to severe intensity defect in the mid to basal inferior and inferolateral wall, consistent with ischemia.  No segmental wall motion abnormality.  Transient ischemic dilation (TID) ratio was abnormal at: 1.35   Post-stress resting myocardial perfusion gated SPECT imaging was performed (LVEF = 61 %;    IMPRESSION AND PLAN:      77 y/o M with PMHx of HTN, HLD, CAD and PSHx of CABG, coronary stent placement presents to the ED with complaints of squeezing chest pain x 2 days. Chest pain is associated with shortness of breath. Patient states symptoms are improved with Nitroglycerin. Patient took earlier and is now without chest pain.       Problem/Plan - 1:  ·  Problem: Chest pain.-ACS  Plan: -EKG showed: NSR no ST or T wave changes  Management:  - DAPT,Lipitor + BB  - No evidence for recent cadiac injury  NSTsuggestive ischemia-for cardiac cath in -[facility]-Dr Juarez  TTE-LV Function      Problem/Plan - 2:  ·  Problem: Hyperlipidemia.  Plan: Patient takes at home  C/W with Statin therapy

## 2018-10-16 ENCOUNTER — INPATIENT (INPATIENT)
Facility: HOSPITAL | Age: 78
LOS: 0 days | Discharge: HOME | End: 2018-10-17
Attending: INTERNAL MEDICINE | Admitting: INTERNAL MEDICINE
Payer: MEDICARE

## 2018-10-16 VITALS
SYSTOLIC BLOOD PRESSURE: 105 MMHG | OXYGEN SATURATION: 100 % | RESPIRATION RATE: 18 BRPM | DIASTOLIC BLOOD PRESSURE: 55 MMHG | TEMPERATURE: 98 F | HEART RATE: 58 BPM

## 2018-10-16 VITALS
HEIGHT: 66 IN | WEIGHT: 172.18 LBS | RESPIRATION RATE: 18 BRPM | HEART RATE: 76 BPM | DIASTOLIC BLOOD PRESSURE: 76 MMHG | TEMPERATURE: 98 F | SYSTOLIC BLOOD PRESSURE: 133 MMHG | OXYGEN SATURATION: 98 %

## 2018-10-16 DIAGNOSIS — Z95.1 PRESENCE OF AORTOCORONARY BYPASS GRAFT: Chronic | ICD-10-CM

## 2018-10-16 DIAGNOSIS — R94.39 ABNORMAL RESULT OF OTHER CARDIOVASCULAR FUNCTION STUDY: ICD-10-CM

## 2018-10-16 LAB
ANION GAP SERPL CALC-SCNC: 8 MMOL/L — SIGNIFICANT CHANGE UP (ref 5–17)
BUN SERPL-MCNC: 44 MG/DL — HIGH (ref 7–18)
CALCIUM SERPL-MCNC: 8.9 MG/DL — SIGNIFICANT CHANGE UP (ref 8.4–10.5)
CHLORIDE SERPL-SCNC: 107 MMOL/L — SIGNIFICANT CHANGE UP (ref 96–108)
CO2 SERPL-SCNC: 24 MMOL/L — SIGNIFICANT CHANGE UP (ref 22–31)
CREAT SERPL-MCNC: 1.62 MG/DL — HIGH (ref 0.5–1.3)
GLUCOSE SERPL-MCNC: 92 MG/DL — SIGNIFICANT CHANGE UP (ref 70–99)
HCT VFR BLD CALC: 38.9 % — LOW (ref 39–50)
HGB BLD-MCNC: 12.6 G/DL — LOW (ref 13–17)
MCHC RBC-ENTMCNC: 30 PG — SIGNIFICANT CHANGE UP (ref 27–34)
MCHC RBC-ENTMCNC: 32.5 GM/DL — SIGNIFICANT CHANGE UP (ref 32–36)
MCV RBC AUTO: 92.2 FL — SIGNIFICANT CHANGE UP (ref 80–100)
PLATELET # BLD AUTO: 223 K/UL — SIGNIFICANT CHANGE UP (ref 150–400)
POTASSIUM SERPL-MCNC: 4.3 MMOL/L — SIGNIFICANT CHANGE UP (ref 3.5–5.3)
POTASSIUM SERPL-SCNC: 4.3 MMOL/L — SIGNIFICANT CHANGE UP (ref 3.5–5.3)
RBC # BLD: 4.22 M/UL — SIGNIFICANT CHANGE UP (ref 4.2–5.8)
RBC # FLD: 13.5 % — SIGNIFICANT CHANGE UP (ref 10.3–14.5)
SODIUM SERPL-SCNC: 139 MMOL/L — SIGNIFICANT CHANGE UP (ref 135–145)
WBC # BLD: 10.6 K/UL — HIGH (ref 3.8–10.5)
WBC # FLD AUTO: 10.6 K/UL — HIGH (ref 3.8–10.5)

## 2018-10-16 PROCEDURE — 93017 CV STRESS TEST TRACING ONLY: CPT

## 2018-10-16 PROCEDURE — 99285 EMERGENCY DEPT VISIT HI MDM: CPT | Mod: 25

## 2018-10-16 PROCEDURE — 92920 PRQ TRLUML C ANGIOP 1ART&/BR: CPT | Mod: RC

## 2018-10-16 PROCEDURE — 93459 L HRT ART/GRFT ANGIO: CPT | Mod: 26,59

## 2018-10-16 PROCEDURE — 80048 BASIC METABOLIC PNL TOTAL CA: CPT

## 2018-10-16 PROCEDURE — 93306 TTE W/DOPPLER COMPLETE: CPT

## 2018-10-16 PROCEDURE — 76937 US GUIDE VASCULAR ACCESS: CPT | Mod: 26

## 2018-10-16 PROCEDURE — 83036 HEMOGLOBIN GLYCOSYLATED A1C: CPT

## 2018-10-16 PROCEDURE — 36415 COLL VENOUS BLD VENIPUNCTURE: CPT

## 2018-10-16 PROCEDURE — 82550 ASSAY OF CK (CPK): CPT

## 2018-10-16 PROCEDURE — 84484 ASSAY OF TROPONIN QUANT: CPT

## 2018-10-16 PROCEDURE — 80061 LIPID PANEL: CPT

## 2018-10-16 PROCEDURE — 71046 X-RAY EXAM CHEST 2 VIEWS: CPT

## 2018-10-16 PROCEDURE — 85027 COMPLETE CBC AUTOMATED: CPT

## 2018-10-16 PROCEDURE — 93005 ELECTROCARDIOGRAM TRACING: CPT

## 2018-10-16 PROCEDURE — 78452 HT MUSCLE IMAGE SPECT MULT: CPT

## 2018-10-16 PROCEDURE — 93010 ELECTROCARDIOGRAM REPORT: CPT

## 2018-10-16 PROCEDURE — A9502: CPT

## 2018-10-16 PROCEDURE — 82553 CREATINE MB FRACTION: CPT

## 2018-10-16 PROCEDURE — 84443 ASSAY THYROID STIM HORMONE: CPT

## 2018-10-16 RX ORDER — SODIUM CHLORIDE 9 MG/ML
3 INJECTION INTRAMUSCULAR; INTRAVENOUS; SUBCUTANEOUS EVERY 8 HOURS
Qty: 0 | Refills: 0 | Status: DISCONTINUED | OUTPATIENT
Start: 2018-10-16 | End: 2018-10-17

## 2018-10-16 RX ORDER — RANOLAZINE 500 MG/1
500 TABLET, FILM COATED, EXTENDED RELEASE ORAL
Qty: 0 | Refills: 0 | Status: DISCONTINUED | OUTPATIENT
Start: 2018-10-16 | End: 2018-10-17

## 2018-10-16 RX ORDER — TICAGRELOR 90 MG/1
90 TABLET ORAL ONCE
Qty: 0 | Refills: 0 | Status: COMPLETED | OUTPATIENT
Start: 2018-10-16 | End: 2018-10-16

## 2018-10-16 RX ORDER — ISOSORBIDE MONONITRATE 60 MG/1
30 TABLET, EXTENDED RELEASE ORAL DAILY
Qty: 0 | Refills: 0 | Status: DISCONTINUED | OUTPATIENT
Start: 2018-10-16 | End: 2018-10-17

## 2018-10-16 RX ORDER — ASPIRIN/CALCIUM CARB/MAGNESIUM 324 MG
81 TABLET ORAL DAILY
Qty: 0 | Refills: 0 | Status: DISCONTINUED | OUTPATIENT
Start: 2018-10-17 | End: 2018-10-17

## 2018-10-16 RX ORDER — AMLODIPINE BESYLATE 2.5 MG/1
5 TABLET ORAL DAILY
Qty: 0 | Refills: 0 | Status: DISCONTINUED | OUTPATIENT
Start: 2018-10-16 | End: 2018-10-17

## 2018-10-16 RX ORDER — SODIUM CHLORIDE 9 MG/ML
1000 INJECTION INTRAMUSCULAR; INTRAVENOUS; SUBCUTANEOUS
Qty: 0 | Refills: 0 | Status: DISCONTINUED | OUTPATIENT
Start: 2018-10-16 | End: 2018-10-17

## 2018-10-16 RX ORDER — PANTOPRAZOLE SODIUM 20 MG/1
1 TABLET, DELAYED RELEASE ORAL
Qty: 0 | Refills: 0 | COMMUNITY

## 2018-10-16 RX ORDER — TICAGRELOR 90 MG/1
90 TABLET ORAL
Qty: 0 | Refills: 0 | Status: DISCONTINUED | OUTPATIENT
Start: 2018-10-17 | End: 2018-10-17

## 2018-10-16 RX ORDER — HEPARIN SODIUM 5000 [USP'U]/ML
5000 INJECTION INTRAVENOUS; SUBCUTANEOUS EVERY 12 HOURS
Qty: 0 | Refills: 0 | Status: DISCONTINUED | OUTPATIENT
Start: 2018-10-17 | End: 2018-10-17

## 2018-10-16 RX ORDER — ATORVASTATIN CALCIUM 80 MG/1
80 TABLET, FILM COATED ORAL AT BEDTIME
Qty: 0 | Refills: 0 | Status: DISCONTINUED | OUTPATIENT
Start: 2018-10-16 | End: 2018-10-17

## 2018-10-16 RX ORDER — CARVEDILOL PHOSPHATE 80 MG/1
6.25 CAPSULE, EXTENDED RELEASE ORAL EVERY 12 HOURS
Qty: 0 | Refills: 0 | Status: DISCONTINUED | OUTPATIENT
Start: 2018-10-16 | End: 2018-10-17

## 2018-10-16 RX ADMIN — SODIUM CHLORIDE 3 MILLILITER(S): 9 INJECTION INTRAMUSCULAR; INTRAVENOUS; SUBCUTANEOUS at 21:52

## 2018-10-16 RX ADMIN — AMLODIPINE BESYLATE 5 MILLIGRAM(S): 2.5 TABLET ORAL at 05:32

## 2018-10-16 RX ADMIN — SODIUM CHLORIDE 75 MILLILITER(S): 9 INJECTION INTRAMUSCULAR; INTRAVENOUS; SUBCUTANEOUS at 16:50

## 2018-10-16 RX ADMIN — RANOLAZINE 500 MILLIGRAM(S): 500 TABLET, FILM COATED, EXTENDED RELEASE ORAL at 20:15

## 2018-10-16 RX ADMIN — SODIUM CHLORIDE 75 MILLILITER(S): 9 INJECTION INTRAMUSCULAR; INTRAVENOUS; SUBCUTANEOUS at 23:02

## 2018-10-16 RX ADMIN — TICAGRELOR 90 MILLIGRAM(S): 90 TABLET ORAL at 20:28

## 2018-10-16 RX ADMIN — TICAGRELOR 90 MILLIGRAM(S): 90 TABLET ORAL at 05:32

## 2018-10-16 RX ADMIN — ISOSORBIDE MONONITRATE 30 MILLIGRAM(S): 60 TABLET, EXTENDED RELEASE ORAL at 13:42

## 2018-10-16 RX ADMIN — Medication 81 MILLIGRAM(S): at 13:06

## 2018-10-16 RX ADMIN — CARVEDILOL PHOSPHATE 6.25 MILLIGRAM(S): 80 CAPSULE, EXTENDED RELEASE ORAL at 20:15

## 2018-10-16 RX ADMIN — Medication 0.4 MILLIGRAM(S): at 14:21

## 2018-10-16 RX ADMIN — CARVEDILOL PHOSPHATE 6.25 MILLIGRAM(S): 80 CAPSULE, EXTENDED RELEASE ORAL at 05:32

## 2018-10-16 RX ADMIN — ATORVASTATIN CALCIUM 80 MILLIGRAM(S): 80 TABLET, FILM COATED ORAL at 23:01

## 2018-10-16 RX ADMIN — ISOSORBIDE MONONITRATE 30 MILLIGRAM(S): 60 TABLET, EXTENDED RELEASE ORAL at 23:01

## 2018-10-16 NOTE — H&P CARDIOLOGY - NEGATIVE NEUROLOGICAL SYMPTOMS
no hemiparesis/no facial palsy/no headache/no transient paralysis/no weakness/no loss of consciousness/no syncope/no difficulty walking/no confusion/no tremors/no vertigo/no loss of sensation/no generalized seizures/no focal seizures/no paresthesias

## 2018-10-16 NOTE — H&P CARDIOLOGY - HISTORY OF PRESENT ILLNESS
77 y/o M with PMHx of HTN, HLD, CAD s/p stents and PSHx of CABG presented to [facility] ED with complaints of squeezing chest pain x 2 days. Chest pain is associated with shortness of breath. Patient states symptoms are improved with Nitroglycerin. Pt admitted to telemetry floor and cardiac enzymes were negative x3 sets. Pt had a stress test done showing a medium sized, reversible, moderate to severe intensity defect in the mid to basal inferior and inferolateral wall consistent with ischemia and an EF of 61%. Pt is now transferred to [facility] for cardiac catheretization.

## 2018-10-16 NOTE — PROGRESS NOTE ADULT - ASSESSMENT
HPI: 79 y/o M with PMHx of HTN, HLD, CAD and PSHx of CABG, coronary stent placement presents to the ED with complaints of squeezing chest pain x 2 days. Chest pain is associated with shortness of breath. Patient states symptoms are improved with Nitroglycerin.

## 2018-10-16 NOTE — PROGRESS NOTE ADULT - SUBJECTIVE AND OBJECTIVE BOX
PRESENTING CC:Chest pain    SUBJ: 79 y/o M with PMHx of HTN, HLD, CAD and PSHx of CABG, coronary stent placement presents to the ED with complaints of squeezing chest pain x 2 days. Remains chest pain free-no evidence for recent cardiac injury,no overnight events-had NST- medium sized, reversible, moderate to severe intensity defect in the mid to basal inferior and inferolateral wall, consistent with ischemia.Scheduled for Cardiac Cath      PMH -reviewed admission note, no change since admission  Heart failure: acute [ ] chronic [ ] acute or chronic [ ] diastolic [ ] systolic [ ] combined systolic and diastolic[ ]  COOPER: ATN[ ] renal medullary necrosis [ ] CKD I [ ]CKDII [ ]CKD III [ ]CKD IV [ ]CKD V [ ]Other pathological lesions [ ]    MEDICATIONS  (STANDING):  amLODIPine   Tablet 5 milliGRAM(s) Oral daily  aspirin enteric coated 81 milliGRAM(s) Oral daily  atorvastatin 80 milliGRAM(s) Oral at bedtime  carvedilol 6.25 milliGRAM(s) Oral every 12 hours  isosorbide   mononitrate ER Tablet (IMDUR) 30 milliGRAM(s) Oral daily  sodium chloride 0.9%. 1000 milliLiter(s) (75 mL/Hr) IV Continuous <Continuous>  ticagrelor 90 milliGRAM(s) Oral two times a day    MEDICATIONS  (PRN):  nitroglycerin     SubLingual 0.4 milliGRAM(s) SubLingual every 5 minutes PRN Chest Pain          FAMILY HISTORY:  Family history of MI (myocardial infarction) (Sibling)  No family history of premature coronary artery disease or sudden cardiac death      REVIEW OF SYSTEMS:  Constitutional: [ ] fever, [ ]weight loss,  [ ]fatigue  Eyes: [ ] visual changes  Respiratory: [ ]shortness of breath;  [ ] cough, [ ]wheezing, [ ]chills, [ ]hemoptysis  Cardiovascular: [x ] chest pain, [ ]palpitations, [ ]dizziness,  [ ]leg swelling[ ]orthopnea[ ]PND  Gastrointestinal: [ ] abdominal pain, [ ]nausea, [ ]vomiting,  [ ]diarrhea   Genitourinary: [ ] dysuria, [ ] hematuria  Neurologic: [ ] headaches [ ] tremors[ ]weakness  Skin: [ ] itching, [ ]burning, [ ] rashes  Endocrine: [ ] heat or cold intolerance  Musculoskeletal: [ ] joint pain or swelling; [ ] muscle, back, or extremity pain  Psychiatric: [ ] depression, [ ]anxiety, [ ]mood swings, or [ ]difficulty sleeping  Hematologic: [ ] easy bruising, [ ] bleeding gums    [x] All remaining systems negative except as per above.   [ ]Unable to obtain.    Vital Signs Last 24 Hrs  T(C): 36.6 (16 Oct 2018 07:55), Max: 37.1 (15 Oct 2018 23:33)  T(F): 97.9 (16 Oct 2018 07:55), Max: 98.7 (15 Oct 2018 23:33)  HR: 58 (16 Oct 2018 07:55) (52 - 72)  BP: 132/56 (16 Oct 2018 07:55) (101/53 - 140/56)  BP(mean): --  RR: 18 (16 Oct 2018 07:55) (16 - 18)  SpO2: 100% (16 Oct 2018 07:55) (94% - 100%)  I&O's Summary      PHYSICAL EXAM:  General: No acute distress BMI-28  HEENT: EOMI, PERRL  Neck: Supple, [ ] JVD  Lungs: Equal air entry bilaterally; [ ] rales [ ] wheezing [ ] rhonchi  Heart: Regular rate and rhythm; [x ] murmur   2/6 [x ] systolic [ ] diastolic [ ] radiation[ ] rubs [ ]  gallops  Abdomen: Nontender, bowel sounds present  Extremities: No clubbing, cyanosis, [x ] edema  Nervous system:  Alert & Oriented X3, no focal deficits  Psychiatric: Normal affect  Skin: No rashes or lesions    LABS:  10-16    139  |  107  |  44<H>  ----------------------------<  92  4.3   |  24  |  1.62<H>    Ca    8.9      16 Oct 2018 07:08      Creatinine Trend: 1.62<--, 1.52<--, 1.38<--, 1.21<--, 1.22<--                        12.6   10.6  )-----------( 223      ( 16 Oct 2018 07:08 )             38.9    STRESS TEST:There is a medium sized, reversible, moderate to severe intensity defect in the mid to basal inferior and inferolateral wall, consistent with ischemia.  No segmental wall motion abnormality.  Transient ischemic dilation (TID) ratio was abnormal at: 1.35   Post-stress resting myocardial perfusion gated SPECT imaging was performed (LVEF = 61 %    CATHETERIZATION:      IMPRESSION AND PLAN:      79 y/o M with PMHx of HTN, HLD, CAD and PSHx of CABG, coronary stent placement presents to the ED with complaints of squeezing chest pain x 2 days. Chest pain is associated with shortness of breath. Patient states symptoms are improved with Nitroglycerin. Patient took earlier and is now without chest pain.       Problem/Plan - 1:  ·  Problem: Chest pain.-ACS  Plan: -EKG showed: NSR no ST or T wave changes  Management:  - DAPT,Lipitor + BB  - No evidence for recent cadiac injury  NSTsuggestive ischemia-for cardiac cath in -[facility]-Dr Juarez  TTE-LV Function      Problem/Plan - 2:  ·  Problem: Hyperlipidemia.  Plan: Patient takes at home  C/W with Statin therapy      Problem/Plan - 3:  - Problem:COOPER-On No nephrotoxin drugs-continue IVF  Monitor Creatinine

## 2018-10-16 NOTE — CHART NOTE - NSCHARTNOTEFT_GEN_A_CORE
S/p LHC via RFA, dressing clean, dry, intact, no bleeding noted, no hematoma palpated. Femoral, DP, PT pulses 2+, cap refill <2sec, denies numbness, tingling in RLE. Pt denies pain at site, will continue to monitor.

## 2018-10-16 NOTE — PROGRESS NOTE ADULT - SUBJECTIVE AND OBJECTIVE BOX
Patient is a 78y old  Male who presents with a chief complaint of Chest pain (14 Oct 2018 20:51)      INTERVAL HPI/OVERNIGHT EVENTS: Nuclear stress test showed Moderate ischemia and positive stress test. Spoke with cardiologist. Pt to be transferred tomorrow for cardiac catheterization.    MEDICATIONS  (STANDING):  amLODIPine   Tablet 5 milliGRAM(s) Oral daily  atorvastatin 80 milliGRAM(s) Oral at bedtime  carvedilol 6.25 milliGRAM(s) Oral every 12 hours  isosorbide   mononitrate ER Tablet (IMDUR) 30 milliGRAM(s) Oral daily  ranolazine 500 milliGRAM(s) Oral two times a day  sodium chloride 0.9% lock flush 3 milliLiter(s) IV Push every 8 hours  sodium chloride 0.9%. 1000 milliLiter(s) (75 mL/Hr) IV Continuous <Continuous>    MEDICATIONS  (PRN):    Vital Signs Last 24 Hrs  T(C): 36.5 (16 Oct 2018 11:20), Max: 37.1 (15 Oct 2018 23:33)  T(F): 97.7 (16 Oct 2018 11:20), Max: 98.7 (15 Oct 2018 23:33)  HR: 58 (16 Oct 2018 11:20) (52 - 69)  BP: 105/55 (16 Oct 2018 11:20) (101/53 - 140/56)  BP(mean): --  RR: 18 (16 Oct 2018 11:20) (16 - 18)  SpO2: 100% (16 Oct 2018 11:20) (94% - 100%)    REVIEW OF SYSTEMS:  CONSTITUTIONAL: No fever, weight loss, or fatigue  RESPIRATORY: No cough, wheezing, chills or hemoptysis; No shortness of breath  CARDIOVASCULAR: No chest pain, palpitations, dizziness, or leg swelling  GASTROINTESTINAL: No abdominal or epigastric pain. No nausea, vomiting, or hematemesis; No diarrhea or constipation. No melena or hematochezia.  NEUROLOGICAL: No headaches, memory loss, loss of strength, numbness, or tremors  SKIN: No itching, burning, rashes, or lesions       PHYSICAL EXAM:  GENERAL: NAD, well-groomed, well-developed  HEAD:  Atraumatic, Normocephalic  EYES: EOMI, PERRLA, conjunctiva and sclera clear  NECK: Supple, No JVD, Normal thyroid  CHEST/LUNG: Clear to percussion bilaterally; No rales, rhonchi, wheezing, or rubs  HEART: Regular rate and rhythm; No murmurs, rubs, or gallops  ABDOMEN: Soft, Nontender, Nondistended; Bowel sounds present  NERVOUS SYSTEM:  Alert & Oriented X3, Good concentration; Motor Strength 5/5 B/L   EXTREMITIES:  2+ Peripheral Pulses, No clubbing, cyanosis, or edema    LABS:  10-16    139  |  107  |  44<H>  ----------------------------<  92  4.3   |  24  |  1.62<H>    Ca    8.9      16 Oct 2018 07:08      Creatinine Trend: 1.62 <--, 1.52 <--, 1.38 <--, 1.21 <--, 1.22 <--                        12.6   10.6  )-----------( 223      ( 16 Oct 2018 07:08 )             38.9     Urine Studies:

## 2018-10-16 NOTE — PROGRESS NOTE ADULT - ATTENDING COMMENTS
Thank you for the courtesy of the consultation,I would be available for any further discussion if needed.  Chauncey Gonsalez MD,FACC.  [address] [place], [place] [id number] [phone]

## 2018-10-16 NOTE — H&P CARDIOLOGY - RS GEN PE MLT RESP DETAILS PC
breath sounds equal/no chest wall tenderness/airway patent/good air movement/respirations non-labored/clear to auscultation bilaterally

## 2018-10-17 VITALS — DIASTOLIC BLOOD PRESSURE: 65 MMHG | HEART RATE: 71 BPM | SYSTOLIC BLOOD PRESSURE: 121 MMHG

## 2018-10-17 DIAGNOSIS — I10 ESSENTIAL (PRIMARY) HYPERTENSION: ICD-10-CM

## 2018-10-17 DIAGNOSIS — N18.9 CHRONIC KIDNEY DISEASE, UNSPECIFIED: ICD-10-CM

## 2018-10-17 DIAGNOSIS — I25.10 ATHEROSCLEROTIC HEART DISEASE OF NATIVE CORONARY ARTERY WITHOUT ANGINA PECTORIS: ICD-10-CM

## 2018-10-17 DIAGNOSIS — N18.3 CHRONIC KIDNEY DISEASE, STAGE 3 (MODERATE): ICD-10-CM

## 2018-10-17 DIAGNOSIS — N17.9 ACUTE KIDNEY FAILURE, UNSPECIFIED: ICD-10-CM

## 2018-10-17 LAB
ALBUMIN SERPL ELPH-MCNC: 4.3 G/DL — SIGNIFICANT CHANGE UP (ref 3.3–5)
ALP SERPL-CCNC: 56 U/L — SIGNIFICANT CHANGE UP (ref 40–120)
ALT FLD-CCNC: 15 U/L — SIGNIFICANT CHANGE UP (ref 4–41)
APPEARANCE UR: CLEAR — SIGNIFICANT CHANGE UP
AST SERPL-CCNC: 17 U/L — SIGNIFICANT CHANGE UP (ref 4–40)
BILIRUB SERPL-MCNC: 0.9 MG/DL — SIGNIFICANT CHANGE UP (ref 0.2–1.2)
BILIRUB UR-MCNC: NEGATIVE — SIGNIFICANT CHANGE UP
BLOOD UR QL VISUAL: NEGATIVE — SIGNIFICANT CHANGE UP
BUN SERPL-MCNC: 27 MG/DL — HIGH (ref 7–23)
CALCIUM SERPL-MCNC: 9.5 MG/DL — SIGNIFICANT CHANGE UP (ref 8.4–10.5)
CHLORIDE SERPL-SCNC: 102 MMOL/L — SIGNIFICANT CHANGE UP (ref 98–107)
CO2 SERPL-SCNC: 22 MMOL/L — SIGNIFICANT CHANGE UP (ref 22–31)
COLOR SPEC: SIGNIFICANT CHANGE UP
CREAT SERPL-MCNC: 1.28 MG/DL — SIGNIFICANT CHANGE UP (ref 0.5–1.3)
GLUCOSE SERPL-MCNC: 92 MG/DL — SIGNIFICANT CHANGE UP (ref 70–99)
GLUCOSE UR-MCNC: NEGATIVE — SIGNIFICANT CHANGE UP
HCT VFR BLD CALC: 41.5 % — SIGNIFICANT CHANGE UP (ref 39–50)
HGB BLD-MCNC: 13.3 G/DL — SIGNIFICANT CHANGE UP (ref 13–17)
KETONES UR-MCNC: NEGATIVE — SIGNIFICANT CHANGE UP
LEUKOCYTE ESTERASE UR-ACNC: NEGATIVE — SIGNIFICANT CHANGE UP
MCHC RBC-ENTMCNC: 29.2 PG — SIGNIFICANT CHANGE UP (ref 27–34)
MCHC RBC-ENTMCNC: 32 % — SIGNIFICANT CHANGE UP (ref 32–36)
MCV RBC AUTO: 91 FL — SIGNIFICANT CHANGE UP (ref 80–100)
NITRITE UR-MCNC: NEGATIVE — SIGNIFICANT CHANGE UP
NRBC # FLD: 0 — SIGNIFICANT CHANGE UP
PH UR: 6.5 — SIGNIFICANT CHANGE UP (ref 5–8)
PLATELET # BLD AUTO: 237 K/UL — SIGNIFICANT CHANGE UP (ref 150–400)
PMV BLD: 11.1 FL — SIGNIFICANT CHANGE UP (ref 7–13)
POTASSIUM SERPL-MCNC: 4.4 MMOL/L — SIGNIFICANT CHANGE UP (ref 3.5–5.3)
POTASSIUM SERPL-SCNC: 4.4 MMOL/L — SIGNIFICANT CHANGE UP (ref 3.5–5.3)
PROT SERPL-MCNC: 7.9 G/DL — SIGNIFICANT CHANGE UP (ref 6–8.3)
PROT UR-MCNC: NEGATIVE — SIGNIFICANT CHANGE UP
RBC # BLD: 4.56 M/UL — SIGNIFICANT CHANGE UP (ref 4.2–5.8)
RBC # FLD: 13.4 % — SIGNIFICANT CHANGE UP (ref 10.3–14.5)
SODIUM SERPL-SCNC: 139 MMOL/L — SIGNIFICANT CHANGE UP (ref 135–145)
SP GR SPEC: 1.02 — SIGNIFICANT CHANGE UP (ref 1–1.04)
UROBILINOGEN FLD QL: NORMAL — SIGNIFICANT CHANGE UP
WBC # BLD: 11.25 K/UL — HIGH (ref 3.8–10.5)
WBC # FLD AUTO: 11.25 K/UL — HIGH (ref 3.8–10.5)

## 2018-10-17 RX ORDER — LISINOPRIL 2.5 MG/1
1 TABLET ORAL
Qty: 0 | Refills: 0 | COMMUNITY

## 2018-10-17 RX ADMIN — Medication 81 MILLIGRAM(S): at 12:15

## 2018-10-17 RX ADMIN — CARVEDILOL PHOSPHATE 6.25 MILLIGRAM(S): 80 CAPSULE, EXTENDED RELEASE ORAL at 17:19

## 2018-10-17 RX ADMIN — RANOLAZINE 500 MILLIGRAM(S): 500 TABLET, FILM COATED, EXTENDED RELEASE ORAL at 17:20

## 2018-10-17 RX ADMIN — CARVEDILOL PHOSPHATE 6.25 MILLIGRAM(S): 80 CAPSULE, EXTENDED RELEASE ORAL at 05:55

## 2018-10-17 RX ADMIN — SODIUM CHLORIDE 3 MILLILITER(S): 9 INJECTION INTRAMUSCULAR; INTRAVENOUS; SUBCUTANEOUS at 16:15

## 2018-10-17 RX ADMIN — RANOLAZINE 500 MILLIGRAM(S): 500 TABLET, FILM COATED, EXTENDED RELEASE ORAL at 05:55

## 2018-10-17 RX ADMIN — ISOSORBIDE MONONITRATE 30 MILLIGRAM(S): 60 TABLET, EXTENDED RELEASE ORAL at 12:15

## 2018-10-17 RX ADMIN — SODIUM CHLORIDE 3 MILLILITER(S): 9 INJECTION INTRAMUSCULAR; INTRAVENOUS; SUBCUTANEOUS at 05:54

## 2018-10-17 RX ADMIN — AMLODIPINE BESYLATE 5 MILLIGRAM(S): 2.5 TABLET ORAL at 05:55

## 2018-10-17 RX ADMIN — HEPARIN SODIUM 5000 UNIT(S): 5000 INJECTION INTRAVENOUS; SUBCUTANEOUS at 05:55

## 2018-10-17 RX ADMIN — TICAGRELOR 90 MILLIGRAM(S): 90 TABLET ORAL at 05:55

## 2018-10-17 RX ADMIN — TICAGRELOR 90 MILLIGRAM(S): 90 TABLET ORAL at 17:19

## 2018-10-17 RX ADMIN — HEPARIN SODIUM 5000 UNIT(S): 5000 INJECTION INTRAVENOUS; SUBCUTANEOUS at 17:20

## 2018-10-17 NOTE — DISCHARGE NOTE ADULT - PATIENT PORTAL LINK FT
You can access the [facility] Patient Portal, offered by [facility], by registering with the following website: [email or web address]

## 2018-10-17 NOTE — CHART NOTE - NSCHARTNOTEFT_GEN_A_CORE
Post Removal of Femoral Sheath Assessment of Access Site    Patient seen and examined at [facility] on 7N 721A    The patient denies pain at the vascular access site    Neuro-vascular status of the right lower extremity is intact, stable and there is no evidence of hematoma on the right lower extremity.    Complications:   [ x ] None    Comments: I explained to the patient again about the need for follow up as an outpatient with Dr. Nighat Juarez to schedule brachytherapy for the RCA. Dr. Juarez's Office number is [phone]    DAVINA Piña  Cardiology Fellow  (p): [phone]

## 2018-10-17 NOTE — CONSULT NOTE ADULT - PROBLEM SELECTOR RECOMMENDATION 2
baseline ~1.2  hx of HTN >30 years  check UA  avoid nephrotoxic agents baseline ~1.2  hx of HTN >30 years  avoid nephrotoxic agents

## 2018-10-17 NOTE — DISCHARGE NOTE ADULT - CARE PLAN
Principal Discharge DX:	Coronary artery disease  Goal:	s/p PTCA to RCA  Assessment and plan of treatment:	cont medications, diet FU Dr AMIN 1 to 2weeks  Secondary Diagnosis:	Hyperlipidemia  Assessment and plan of treatment:	cont medications and diet  Secondary Diagnosis:	Hypertension  Assessment and plan of treatment:	cont medications and diet  Secondary Diagnosis:	Stage 3 chronic kidney disease  Assessment and plan of treatment:	FU with PCP for monitoring

## 2018-10-17 NOTE — CONSULT NOTE ADULT - ASSESSMENT
77 y/o M with PMHx of HTN, HLD, CAD s/p stents and PSHx of CABG presented to [facility] ED 10/12 with chest pain, +NST, transfer to [facility] for cath. s/p cath 10/16. noted to have COOPER 10/14, scr peaked 1.62 on 10/16

## 2018-10-17 NOTE — DISCHARGE NOTE ADULT - PLAN OF CARE
s/p PTCA to RCA cont medications, diet FU Dr AMIN 1 to 2weeks cont medications and diet FU with PCP for monitoring

## 2018-10-17 NOTE — DISCHARGE NOTE ADULT - HOSPITAL COURSE
79 y/o M with PMHx of HTN, HLD, CAD s/p stents and PSHx of CABG presented to [facility] ED with complaints of squeezing chest pain x 2 days    s/p PTCA to RCA  + COOPER -  ACEi on hold  Cr: 1.62    LHC: LM severe dz, LCx severe dz, LIMA to LAD patent, mid/distal RCA 95% ISR s/p POBA, LVEDP 8, RFA accessed  10/17 pt stable for d/c home to  Dr Nunez

## 2018-10-17 NOTE — CONSULT NOTE ADULT - SUBJECTIVE AND OBJECTIVE BOX
[facility]   MD DEBORAH BECKMAN MD ANGELA WONG, PA    TEL:  OFFICE: [phone]  DR ELLER CELL: [phone]  DR. SEWELL CELL: [phone]  ALEX LOMAS CELL: [phone]      HPI:  77 y/o M with PMHx of HTN, HLD, CAD s/p stents and PSHx of CABG presented to [facility] ED 10/12 with complaints of squeezing chest pain x 2 days, associated with shortness of breath.  + stress test at [facility], transferred to [facility] for cardiac catheretization. s/p PCI 10/16. Patient was noted to have COOPER on 10/14, scr peaked 1.62 on 10/16. now improving. examined at bedside currently denied any chest pain, sob, dysuria, hematuria, fever or chills.       Allergies:  eggs (Pruritus)  No Known Drug Allergies  pork (Unknown)  shellfish (Hives)      PAST MEDICAL & SURGICAL HISTORY:  Hyperlipidemia  Hypertension  Coronary artery disease  S/P CABG x 3      Home Medications Reviewed    Hospital Medications:   MEDICATIONS  (STANDING):  amLODIPine   Tablet 5 milliGRAM(s) Oral daily  aspirin enteric coated 81 milliGRAM(s) Oral daily  atorvastatin 80 milliGRAM(s) Oral at bedtime  carvedilol 6.25 milliGRAM(s) Oral every 12 hours  heparin  Injectable 5000 Unit(s) SubCutaneous every 12 hours  isosorbide   mononitrate ER Tablet (IMDUR) 30 milliGRAM(s) Oral daily  ranolazine 500 milliGRAM(s) Oral two times a day  sodium chloride 0.9% lock flush 3 milliLiter(s) IV Push every 8 hours  sodium chloride 0.9%. 1000 milliLiter(s) (75 mL/Hr) IV Continuous <Continuous>  ticagrelor 90 milliGRAM(s) Oral two times a day      SOCIAL HISTORY:  Denies ETOh, Smoking,     FAMILY HISTORY:  Family history of MI (myocardial infarction) (Sibling)      REVIEW OF SYSTEMS:  CONSTITUTIONAL: No weakness, fevers or chills  EYES/ENT: No visual changes;  No vertigo or throat pain   NECK: No pain or stiffness  RESPIRATORY: No cough, wheezing, hemoptysis; No shortness of breath  CARDIOVASCULAR: No chest pain or palpitations.  GASTROINTESTINAL: No abdominal or epigastric pain. No nausea, vomiting, or hematemesis; No diarrhea or constipation. No melena or hematochezia.  GENITOURINARY: No dysuria, frequency, foamy urine, urinary urgency, incontinence or hematuria  NEUROLOGICAL: No numbness or weakness  SKIN: No itching, burning, rashes, or lesions   VASCULAR: No bilateral lower extremity edema.   All other review of systems is negative unless indicated above.    VITALS:  T(F): 97.7 (10-17-18 @ 05:53), Max: 97.7 (10-16-18 @ 11:20)  HR: 80 (10-17-18 @ 05:53)  BP: 121/73 (10-17-18 @ 05:53)  RR: 18 (10-17-18 @ 05:53)  SpO2: 98% (10-17-18 @ 05:53)  Wt(kg): --    Height (cm): 167.64 (10-16 @ 20:51)  Weight (kg): 78.1 (10-16 @ 20:51)  BMI (kg/m2): 27.8 (10-16 @ 20:51)  BSA (m2): 1.88 (10-16 @ 20:51)    PHYSICAL EXAM:  Constitutional: NAD  HEENT: anicteric sclera, oropharynx clear, MMM  Neck: No JVD  Respiratory: CTAB, no wheezes, rales or rhonchi  Cardiovascular: S1, S2, RRR  Gastrointestinal: BS+, soft, NT/ND  Extremities: No cyanosis or clubbing. No peripheral edema  Neurological: A/O x 3, no focal deficits  Psychiatric: Normal mood, normal affect  : No CVA tenderness. No ramirez.   Skin: No rashes    LABS:  10-17    139  |  102  |  27<H>  ----------------------------<  92  4.4   |  22  |  1.28    Ca    9.5      17 Oct 2018 06:07    TPro  7.9  /  Alb  4.3  /  TBili  0.9  /  DBili      /  AST  17  /  ALT  15  /  AlkPhos  56  10-17    Creatinine Trend: 1.28 <--, 1.62 <--, 1.52 <--, 1.38 <--, 1.21 <--, 1.22 <--                        13.3   11.25 )-----------( 237      ( 17 Oct 2018 06:07 )             41.5     Urine Studies:        RADIOLOGY & ADDITIONAL STUDIES: [facility]   MD DEBORAH BECKMAN MD ANGELA WONG, PA    TEL:  OFFICE: [phone]  DR ELLER CELL: [phone]  DR. SEWELL CELL: [phone]  ALEX LOMAS CELL: [phone]      HPI:  79 y/o M with PMHx of HTN, HLD, CAD s/p stents and PSHx of CABG presented to [facility] ED 10/12 with complaints of squeezing chest pain x 2 days, associated with shortness of breath.  + stress test at [facility], transferred to [facility] for cardiac catheretization. s/p PCI 10/16. Patient was noted to have COOPER on 10/14, scr peaked 1.62 on 10/16. now improving. examined at bedside currently denied any chest pain, sob, dysuria, hematuria, fever or chills.       Allergies:  eggs (Pruritus)  No Known Drug Allergies  pork (Unknown)  shellfish (Hives)      PAST MEDICAL & SURGICAL HISTORY:  Hyperlipidemia  Hypertension  Coronary artery disease  S/P CABG x 3      Home Medications Reviewed    Hospital Medications:   MEDICATIONS  (STANDING):  amLODIPine   Tablet 5 milliGRAM(s) Oral daily  aspirin enteric coated 81 milliGRAM(s) Oral daily  atorvastatin 80 milliGRAM(s) Oral at bedtime  carvedilol 6.25 milliGRAM(s) Oral every 12 hours  heparin  Injectable 5000 Unit(s) SubCutaneous every 12 hours  isosorbide   mononitrate ER Tablet (IMDUR) 30 milliGRAM(s) Oral daily  ranolazine 500 milliGRAM(s) Oral two times a day  sodium chloride 0.9% lock flush 3 milliLiter(s) IV Push every 8 hours  sodium chloride 0.9%. 1000 milliLiter(s) (75 mL/Hr) IV Continuous <Continuous>  ticagrelor 90 milliGRAM(s) Oral two times a day      SOCIAL HISTORY:  Denies ETOh, Smoking,     FAMILY HISTORY:  Family history of MI (myocardial infarction) (Sibling)      REVIEW OF SYSTEMS:  CONSTITUTIONAL: No weakness, fevers or chills  EYES/ENT: No visual changes;  No vertigo or throat pain   NECK: No pain or stiffness  RESPIRATORY: No cough, wheezing, hemoptysis; No shortness of breath  CARDIOVASCULAR: No chest pain or palpitations.  GASTROINTESTINAL: No abdominal or epigastric pain.   GENITOURINARY: No dysuria, frequency, foamy urine, urinary urgency, incontinence or hematuria  NEUROLOGICAL: No numbness or weakness  SKIN: No itching, burning, rashes, or lesions   VASCULAR: No bilateral lower extremity edema.   All other review of systems is negative unless indicated above.    VITALS:  T(F): 97.7 (10-17-18 @ 05:53), Max: 97.7 (10-16-18 @ 11:20)  HR: 80 (10-17-18 @ 05:53)  BP: 121/73 (10-17-18 @ 05:53)  RR: 18 (10-17-18 @ 05:53)  SpO2: 98% (10-17-18 @ 05:53)  Wt(kg): --    Height (cm): 167.64 (10-16 @ 20:51)  Weight (kg): 78.1 (10-16 @ 20:51)  BMI (kg/m2): 27.8 (10-16 @ 20:51)  BSA (m2): 1.88 (10-16 @ 20:51)    PHYSICAL EXAM:  Constitutional: NAD  HEENT: anicteric sclera, oropharynx clear, MMM  Neck: No JVD  Respiratory: CTAB, no wheezes, rales or rhonchi  Cardiovascular: S1, S2, RRR  Gastrointestinal: BS+, soft, NT/ND  Extremities: No cyanosis or clubbing. No peripheral edema  Neurological: A/O x 3, no focal deficits  Psychiatric: Normal mood, normal affect  : No CVA tenderness. No ramirez.   Skin: No rashes    LABS:  10-17    139  |  102  |  27<H>  ----------------------------<  92  4.4   |  22  |  1.28    Ca    9.5      17 Oct 2018 06:07    TPro  7.9  /  Alb  4.3  /  TBili  0.9  /  DBili      /  AST  17  /  ALT  15  /  AlkPhos  56  10-17    Creatinine Trend: 1.28 <--, 1.62 <--, 1.52 <--, 1.38 <--, 1.21 <--, 1.22 <--                        13.3   11.25 )-----------( 237      ( 17 Oct 2018 06:07 )             41.5     Urine Studies:        RADIOLOGY & ADDITIONAL STUDIES:

## 2018-10-17 NOTE — CONSULT NOTE ADULT - PROBLEM SELECTOR RECOMMENDATION 9
? etiology, possible ATN, now improved.   Scr peaked 1.6 10/16.  s/p angiogram 10/16, monitor bmp  check UA  monitor bmp Pt with COOPER , etiology?  Renal function now improving   Scr peaked to  1.6 10/16.  Baseline Scr 1.2   UA with no blood no protein   s/p angiogram 10/16,   monitor bmp  strict I/O  Avoid nephrotoxics, NSAIDs RCA

## 2018-10-17 NOTE — DISCHARGE NOTE ADULT - MEDICATION SUMMARY - MEDICATIONS TO TAKE
I will START or STAY ON the medications listed below when I get home from the hospital:    Aspirin Enteric Coated 81 mg oral delayed release tablet  -- 1 tab(s) by mouth once a day  -- Indication: For Coronary artery disease    Imdur 30 mg oral tablet, extended release  -- 1 tab(s) by mouth once a day (in the morning)  -- Indication: For Chronic kidney disease-mineral and bone disorder    nitroglycerin 0.4 mg sublingual tablet  -- 1 tab(s) under tongue every 5 minutes, As Needed - for chest pain Maximum Up To Three Doses    Dispense: 1 vial   -- Indication: For Coronary artery disease    Ranexa 500 mg oral tablet, extended release  -- 1 tab(s) by mouth 2 times a day  -- Indication: For Coronary artery disease    Lipitor 80 mg oral tablet  -- 1 tab(s) by mouth once a day (at bedtime)  -- Indication: For Hyperlipidemia    Brilinta (ticagrelor) 90 mg oral tablet  -- 1 tab(s) by mouth 2 times a day  -- Indication: For Coronary artery disease    Coreg 6.25 mg oral tablet  -- 1 tab(s) by mouth 2 times a day  -- Indication: For Hypertension    amLODIPine 5 mg oral tablet  -- 1 tab(s) by mouth once a day  -- Indication: For Hypertension

## 2018-10-17 NOTE — PROGRESS NOTE ADULT - ATTENDING COMMENTS
Thank you for the courtesy of the consultation,I would be available for any further discussion if needed.  Chauncey Gonsalez MD,FACC.  [address] [place], [place] [id number] [phone]

## 2018-10-17 NOTE — PROGRESS NOTE ADULT - SUBJECTIVE AND OBJECTIVE BOX
PRESENTING CC:Chest Pain    SUBJ: 77 y/o M with PMHx of HTN, HLD, CAD s/p stents and PSHx of CABG [facility] with complaints of squeezing chest pain Pt had a stress test done showing a medium sized, reversible, moderate to severe intensity defect in the mid to basal inferior and inferolateral wall consistent with ischemia and an EF of 61% transferred for Cardiac Cath  -Successful POBA to severe ISR mid and distal RCA  Patent LIMA to LAD  Native left main and native LCx have severe diffuse atherosclerosis (similar to cath report from April 2018)  Plan for brachytherapy to RCA in 2-4 weeks to reduce risk of ISR      PMH -reviewed admission note, no change since admission  Heart failure: acute [ ] chronic [ ] acute or chronic [ ] diastolic [ ] systolic [ ] combined systolic and diastolic[ ]  COOPER: ATN[ ] renal medullary necrosis [ ] CKD I [ ]CKDII [ ]CKD III [ ]CKD IV [ ]CKD V [ ]Other pathological lesions [ ]    MEDICATIONS  (STANDING):  amLODIPine   Tablet 5 milliGRAM(s) Oral daily  aspirin enteric coated 81 milliGRAM(s) Oral daily  atorvastatin 80 milliGRAM(s) Oral at bedtime  carvedilol 6.25 milliGRAM(s) Oral every 12 hours  heparin  Injectable 5000 Unit(s) SubCutaneous every 12 hours  isosorbide   mononitrate ER Tablet (IMDUR) 30 milliGRAM(s) Oral daily  ranolazine 500 milliGRAM(s) Oral two times a day  sodium chloride 0.9% lock flush 3 milliLiter(s) IV Push every 8 hours  sodium chloride 0.9%. 1000 milliLiter(s) (75 mL/Hr) IV Continuous <Continuous>  ticagrelor 90 milliGRAM(s) Oral two times a day      FAMILY HISTORY:  Family history of MI (myocardial infarction) (Sibling)  No family history of premature coronary artery disease or sudden cardiac death      REVIEW OF SYSTEMS:  Constitutional: [ ] fever, [ ]weight loss,  [ ]fatigue  Eyes: [ ] visual changes  Respiratory: [ ]shortness of breath;  [ ] cough, [ ]wheezing, [ ]chills, [ ]hemoptysis  Cardiovascular: [x ] chest pain, [ ]palpitations, [ ]dizziness,  [ ]leg swelling[ ]orthopnea[ ]PND  Gastrointestinal: [ ] abdominal pain, [ ]nausea, [ ]vomiting,  [ ]diarrhea   Genitourinary: [ ] dysuria, [ ] hematuria  Neurologic: [ ] headaches [ ] tremors[ ]weakness  Skin: [ ] itching, [ ]burning, [ ] rashes  Endocrine: [ ] heat or cold intolerance  Musculoskeletal: [ ] joint pain or swelling; [ ] muscle, back, or extremity pain  Psychiatric: [ ] depression, [ ]anxiety, [ ]mood swings, or [ ]difficulty sleeping  Hematologic: [ ] easy bruising, [ ] bleeding gums    [x] All remaining systems negative except as per above.   [ ]Unable to obtain.    Vital Signs Last 24 Hrs  T(C): 36.5 (17 Oct 2018 05:53), Max: 36.6 (16 Oct 2018 07:55)  T(F): 97.7 (17 Oct 2018 05:53), Max: 97.9 (16 Oct 2018 07:55)  HR: 80 (17 Oct 2018 05:53) (58 - 80)  BP: 121/73 (17 Oct 2018 05:53) (105/55 - 133/76)  RR: 18 (17 Oct 2018 05:53) (18 - 18)  SpO2: 98% (17 Oct 2018 05:53) (98% - 100%)  I&O's Summary      PHYSICAL EXAM:  General: No acute distress BMI-27.8  HEENT: EOMI, PERRL  Neck: Supple, [ ] JVD  Lungs: Equal air entry bilaterally; [ ] rales [ ] wheezing [ ] rhonchi  Heart: Regular rate and rhythm; [ ] murmur   /6 [ ] systolic [ ] diastolic [ ] radiation[ ] rubs [ ]  gallops  Abdomen: Nontender, bowel sounds present  Extremities: No clubbing, cyanosis, [ ] edema  Nervous system:  Alert & Oriented X3, no focal deficits  Psychiatric: Normal affect  Skin: No rashes or lesions    LABS:  10-16    139  |  107  |  44<H>  ----------------------------<  92  4.3   |  24  |  1.62<H>    Ca    8.9      16 Oct 2018 07:08      Creatinine Trend: 1.62<--, 1.52<--, 1.38<--, 1.21<--, 1.22<--                        12.6   10.6  )-----------( 223      ( 16 Oct 2018 07:08 )             38.9       IMPRESSION AND PLAN:      77 y/o M with PMHx of HTN, HLD, CAD and PSHx of CABG, coronary stent placement presents to the ED with complaints of squeezing chest pain x 2 days. Chest pain is associated with shortness of breath. Patient states symptoms are improved with Nitroglycerin. Patient took earlier and is now without chest pain.       Problem/Plan - 1:  ·  Problem: Chest pain.-ACS  Plan: -EKG showed: NSR no ST or T wave changes  Management:  - DAPT,Lipitor + BB  - No evidence for recent cadiac injury  NSTsuggestive ischemia  Cath--Successful POBA to severe ISR mid and distal RCA  Patent LIMA to LAD  Native left main and native LCx have severe diffuse atherosclerosis (similar to cath report from April 2018)  Plan for brachytherapy to RCA in 2-4 weeks to reduce risk of ISR      Problem/Plan - 2:  ·  Problem: Hyperlipidemia.  Plan: Patient takes at home  C/W with Statin therapy      Problem/Plan - 3:  - Problem:COOPER-On No nephrotoxin drugs-continue IVF  Monitor Creatinine trend

## 2018-11-14 ENCOUNTER — INPATIENT (INPATIENT)
Facility: HOSPITAL | Age: 78
LOS: 0 days | Discharge: HOME | DRG: 251 | End: 2018-11-15
Attending: INTERNAL MEDICINE | Admitting: INTERNAL MEDICINE
Payer: MEDICARE

## 2018-11-14 VITALS
HEIGHT: 66 IN | SYSTOLIC BLOOD PRESSURE: 172 MMHG | OXYGEN SATURATION: 98 % | DIASTOLIC BLOOD PRESSURE: 73 MMHG | TEMPERATURE: 99 F | WEIGHT: 171.96 LBS | RESPIRATION RATE: 14 BRPM | HEART RATE: 59 BPM

## 2018-11-14 DIAGNOSIS — I25.10 ATHEROSCLEROTIC HEART DISEASE OF NATIVE CORONARY ARTERY WITHOUT ANGINA PECTORIS: ICD-10-CM

## 2018-11-14 DIAGNOSIS — Z95.1 PRESENCE OF AORTOCORONARY BYPASS GRAFT: Chronic | ICD-10-CM

## 2018-11-14 LAB
ALBUMIN SERPL ELPH-MCNC: 4.7 G/DL — SIGNIFICANT CHANGE UP (ref 3.3–5)
ALP SERPL-CCNC: 63 U/L — SIGNIFICANT CHANGE UP (ref 40–120)
ALT FLD-CCNC: 22 U/L — SIGNIFICANT CHANGE UP (ref 10–45)
ANION GAP SERPL CALC-SCNC: 13 MMOL/L — SIGNIFICANT CHANGE UP (ref 5–17)
AST SERPL-CCNC: 19 U/L — SIGNIFICANT CHANGE UP (ref 10–40)
BILIRUB SERPL-MCNC: 0.6 MG/DL — SIGNIFICANT CHANGE UP (ref 0.2–1.2)
BUN SERPL-MCNC: 21 MG/DL — SIGNIFICANT CHANGE UP (ref 7–23)
CALCIUM SERPL-MCNC: 9.9 MG/DL — SIGNIFICANT CHANGE UP (ref 8.4–10.5)
CHLORIDE SERPL-SCNC: 104 MMOL/L — SIGNIFICANT CHANGE UP (ref 96–108)
CO2 SERPL-SCNC: 24 MMOL/L — SIGNIFICANT CHANGE UP (ref 22–31)
CREAT SERPL-MCNC: 1.19 MG/DL — SIGNIFICANT CHANGE UP (ref 0.5–1.3)
GLUCOSE SERPL-MCNC: 102 MG/DL — HIGH (ref 70–99)
HCT VFR BLD CALC: 40.2 % — SIGNIFICANT CHANGE UP (ref 39–50)
HGB BLD-MCNC: 13.3 G/DL — SIGNIFICANT CHANGE UP (ref 13–17)
MCHC RBC-ENTMCNC: 29.8 PG — SIGNIFICANT CHANGE UP (ref 27–34)
MCHC RBC-ENTMCNC: 33 GM/DL — SIGNIFICANT CHANGE UP (ref 32–36)
MCV RBC AUTO: 90.2 FL — SIGNIFICANT CHANGE UP (ref 80–100)
PLATELET # BLD AUTO: 222 K/UL — SIGNIFICANT CHANGE UP (ref 150–400)
POTASSIUM SERPL-MCNC: 4.6 MMOL/L — SIGNIFICANT CHANGE UP (ref 3.5–5.3)
POTASSIUM SERPL-SCNC: 4.6 MMOL/L — SIGNIFICANT CHANGE UP (ref 3.5–5.3)
PROT SERPL-MCNC: 8.3 G/DL — SIGNIFICANT CHANGE UP (ref 6–8.3)
RBC # BLD: 4.46 M/UL — SIGNIFICANT CHANGE UP (ref 4.2–5.8)
RBC # FLD: 13.2 % — SIGNIFICANT CHANGE UP (ref 10.3–14.5)
SODIUM SERPL-SCNC: 141 MMOL/L — SIGNIFICANT CHANGE UP (ref 135–145)
WBC # BLD: 8.7 K/UL — SIGNIFICANT CHANGE UP (ref 3.8–10.5)
WBC # FLD AUTO: 8.7 K/UL — SIGNIFICANT CHANGE UP (ref 3.8–10.5)

## 2018-11-14 PROCEDURE — 93010 ELECTROCARDIOGRAM REPORT: CPT | Mod: 76

## 2018-11-14 PROCEDURE — 76937 US GUIDE VASCULAR ACCESS: CPT | Mod: 26,GC

## 2018-11-14 PROCEDURE — 92920 PRQ TRLUML C ANGIOP 1ART&/BR: CPT | Mod: RC,GC

## 2018-11-14 PROCEDURE — 99152 MOD SED SAME PHYS/QHP 5/>YRS: CPT | Mod: GC

## 2018-11-14 PROCEDURE — 99223 1ST HOSP IP/OBS HIGH 75: CPT

## 2018-11-14 PROCEDURE — 77318 BRACHYTX ISODOSE COMPLEX: CPT | Mod: 26

## 2018-11-14 PROCEDURE — 99221 1ST HOSP IP/OBS SF/LOW 40: CPT | Mod: 25

## 2018-11-14 PROCEDURE — 77263 THER RADIOLOGY TX PLNG CPLX: CPT

## 2018-11-14 PROCEDURE — 92974 CATH PLACE CARDIO BRACHYTX: CPT | Mod: GC

## 2018-11-14 PROCEDURE — 77290 THER RAD SIMULAJ FIELD CPLX: CPT | Mod: 26

## 2018-11-14 PROCEDURE — 77470 SPECIAL RADIATION TREATMENT: CPT | Mod: 26

## 2018-11-14 PROCEDURE — 77770 HDR RDNCL NTRSTL/ICAV BRCHTX: CPT | Mod: 26

## 2018-11-14 RX ORDER — AMLODIPINE BESYLATE 2.5 MG/1
1 TABLET ORAL
Qty: 0 | Refills: 0 | COMMUNITY

## 2018-11-14 RX ORDER — RANOLAZINE 500 MG/1
1 TABLET, FILM COATED, EXTENDED RELEASE ORAL
Qty: 0 | Refills: 0 | COMMUNITY

## 2018-11-14 RX ORDER — ATORVASTATIN CALCIUM 80 MG/1
1 TABLET, FILM COATED ORAL
Qty: 0 | Refills: 0 | COMMUNITY

## 2018-11-14 RX ORDER — AMLODIPINE BESYLATE 2.5 MG/1
5 TABLET ORAL DAILY
Qty: 0 | Refills: 0 | Status: DISCONTINUED | OUTPATIENT
Start: 2018-11-14 | End: 2018-11-15

## 2018-11-14 RX ORDER — LISINOPRIL 2.5 MG/1
1 TABLET ORAL
Qty: 0 | Refills: 0 | COMMUNITY

## 2018-11-14 RX ORDER — ATORVASTATIN CALCIUM 80 MG/1
80 TABLET, FILM COATED ORAL AT BEDTIME
Qty: 0 | Refills: 0 | Status: DISCONTINUED | OUTPATIENT
Start: 2018-11-14 | End: 2018-11-15

## 2018-11-14 RX ORDER — CARVEDILOL PHOSPHATE 80 MG/1
6.25 CAPSULE, EXTENDED RELEASE ORAL EVERY 12 HOURS
Qty: 0 | Refills: 0 | Status: DISCONTINUED | OUTPATIENT
Start: 2018-11-14 | End: 2018-11-15

## 2018-11-14 RX ORDER — ASPIRIN/CALCIUM CARB/MAGNESIUM 324 MG
1 TABLET ORAL
Qty: 0 | Refills: 0 | COMMUNITY

## 2018-11-14 RX ORDER — ISOSORBIDE MONONITRATE 60 MG/1
1 TABLET, EXTENDED RELEASE ORAL
Qty: 0 | Refills: 0 | COMMUNITY

## 2018-11-14 RX ORDER — ASPIRIN/CALCIUM CARB/MAGNESIUM 324 MG
81 TABLET ORAL DAILY
Qty: 0 | Refills: 0 | Status: DISCONTINUED | OUTPATIENT
Start: 2018-11-14 | End: 2018-11-15

## 2018-11-14 RX ORDER — ISOSORBIDE MONONITRATE 60 MG/1
30 TABLET, EXTENDED RELEASE ORAL DAILY
Qty: 0 | Refills: 0 | Status: DISCONTINUED | OUTPATIENT
Start: 2018-11-14 | End: 2018-11-15

## 2018-11-14 RX ORDER — LISINOPRIL 2.5 MG/1
20 TABLET ORAL DAILY
Qty: 0 | Refills: 0 | Status: DISCONTINUED | OUTPATIENT
Start: 2018-11-14 | End: 2018-11-15

## 2018-11-14 RX ORDER — CARVEDILOL PHOSPHATE 80 MG/1
1 CAPSULE, EXTENDED RELEASE ORAL
Qty: 0 | Refills: 0 | COMMUNITY

## 2018-11-14 RX ORDER — TICAGRELOR 90 MG/1
90 TABLET ORAL
Qty: 0 | Refills: 0 | Status: DISCONTINUED | OUTPATIENT
Start: 2018-11-14 | End: 2018-11-15

## 2018-11-14 RX ORDER — TICAGRELOR 90 MG/1
1 TABLET ORAL
Qty: 0 | Refills: 0 | COMMUNITY

## 2018-11-14 RX ADMIN — ATORVASTATIN CALCIUM 80 MILLIGRAM(S): 80 TABLET, FILM COATED ORAL at 21:26

## 2018-11-14 RX ADMIN — TICAGRELOR 90 MILLIGRAM(S): 90 TABLET ORAL at 21:26

## 2018-11-14 RX ADMIN — CARVEDILOL PHOSPHATE 6.25 MILLIGRAM(S): 80 CAPSULE, EXTENDED RELEASE ORAL at 21:26

## 2018-11-14 NOTE — CONSULT NOTE ADULT - SUBJECTIVE AND OBJECTIVE BOX
CHIEF COMPLAINT:CAD-for Brachytherapy    HPI:-79 y/o Pakistani M with PMHx of HTN, HLD, CAD s/p stents (mid and distal RCA) and recently on Oct 16,2018 POBA to mRCA and CABG 3V (1990[facility]) presented today for brachytherapy to RCA to reduce risk of further ISR to RCA. Patient denies any reportable symptoms since discharge from hospital. Here today for cardiac cath for brachy.    PAST MEDICAL & SURGICAL HISTORY:  History of coronary artery stent placement: mid and distal RCA  POBA 10/16/18  Hyperlipidemia  Hypertension  Coronary artery disease  S/P CABG x 3: (1990[facility])      MEDICATIONS  (STANDING):  amLODIPine   Tablet 5 milliGRAM(s) Oral daily  aspirin enteric coated 81 milliGRAM(s) Oral daily  atorvastatin 80 milliGRAM(s) Oral at bedtime  carvedilol 6.25 milliGRAM(s) Oral every 12 hours  isosorbide   mononitrate ER Tablet (IMDUR) 30 milliGRAM(s) Oral daily  lisinopril 20 milliGRAM(s) Oral daily  ticagrelor 90 milliGRAM(s) Oral two times a day    MEDICATIONS  (PRN):      FAMILY HISTORY:  Family history of MI (myocardial infarction) (Sibling)    No family history of premature coronary artery disease or sudden cardiac death    SOCIAL HISTORY:  Smoking-  Alcohol-  Ilicit Drug use-    REVIEW OF SYSTEMS:  Constitutional: [ ] fever, [ ]weight loss, [ ]fatigue Activity [ ] Bedbound,[ ] Ambulates [ ] Unassisted[ ] Cane/Walker [ ] Assistence.  Eyes: [ ] visual changes  Respiratory: [ ]shortness of breath;  [ ] cough, [ ]wheezing, [ ]chills, [ ]hemoptysis  Cardiovascular: [ ] chest pain, [ ]palpitations, [ ]dizziness,  [ ]leg swelling[ ]orthopnea [ ]PND  Gastrointestinal: [ ] abdominal pain, [ ]nausea, [ ]vomiting,  [ ]diarrhea,[ ]constipation  Genitourinary: [ ] dysuria, [ ] hematuria  Neurologic: [ ] headaches [ ] tremors[ ] weakness  Skin: [ ] itching, [ ]burning, [ ] rashes  Endocrine: [ ] heat or cold intolerance  Musculoskeletal: [ ] joint pain or swelling; [ ] muscle, back, or extremity pain  Psychiatric: [ ] depression, [ ]anxiety, [ ]mood swings, or [ ]difficulty sleeping  Hematologic: [ ] easy bruising, [ ] bleeding gums       [ x] All others negative	  [ ] Unable to obtain    Vital Signs Last 24 Hrs  T(C): 36.7 (15 Nov 2018 05:02), Max: 37.2 (14 Nov 2018 11:17)  T(F): 98.1 (15 Nov 2018 05:02), Max: 98.9 (14 Nov 2018 11:17)  HR: 76 (15 Nov 2018 05:02) (59 - 100)  BP: 122/66 (15 Nov 2018 05:02) (121/88 - 172/73)  BP(mean): 106 (14 Nov 2018 11:17) (106 - 106)  RR: 16 (15 Nov 2018 05:02) (14 - 20)  SpO2: 95% (15 Nov 2018 05:02) (95% - 100%)  I&O's Summary    14 Nov 2018 07:01  -  15 Nov 2018 07:00  --------------------------------------------------------  IN: 0 mL / OUT: 700 mL / NET: -700 mL        PHYSICAL EXAM:  General: No acute distress BMI-  HEENT: EOMI, PERRL[ ] Icteric  Neck: Supple, No JVD  Lungs: Equal air entry bilaterally; [ ] Rales [ ] Rhonchi [ ] Wheezing  Heart: Regular rate and rhythm;[ ] Murmurs-   /6 [ ] Systolic [ ] Diastolic [ ] Radiation,No rubs, or gallops  Abdomen: Nontender, bowel sounds present  Extremities: No clubbing, cyanosis, or edema[ ] Calf tenderness  Nervous system:  Alert & Oriented X3, no focal deficits  Psychiatric: Normal affect  Skin: No rashes or lesions      LABS:  11-14    141  |  104  |  21  ----------------------------<  102<H>  4.6   |  24  |  1.19    Ca    9.9      14 Nov 2018 11:15    TPro  8.3  /  Alb  4.7  /  TBili  0.6  /  DBili  x   /  AST  19  /  ALT  22  /  AlkPhos  63  11-14    Creatinine Trend: 1.19<--, 1.28<--, 1.62<--                        13.3   8.7   )-----------( 222      ( 14 Nov 2018 11:15 )             40.2         Lipid Panel:   Cardiac Enzymes:           RADIOLOGY:    ECG [my interpretation]:    TELEMETRY:    ECHO:    STRESS TEST:    CATHETERIZATION: CHIEF COMPLAINT:CAD-for Brachytherapy    HPI:-77 y/o Solomon Islander M with PMHx of HTN, HLD, CAD s/p stents (mid and distal RCA) and recently on Oct 16,2018 POBA to mRCA and CABG 3V ([facility]) presented today for brachytherapy to RCA to reduce risk of further ISR to RCA. Patient denies any reportable symptoms since discharge from hospital. Here today for cardiac cath for brcdnaikgnfhx-LMO-nl chest pain or dyspnea-is compliant to meds    PAST MEDICAL & SURGICAL HISTORY:  History of coronary artery stent placement: mid and distal RCA  POBA 10/16/18  Hyperlipidemia  Hypertension  Coronary artery disease  S/P CABG x 3: (1990, [facility])      MEDICATIONS  (STANDING):  amLODIPine   Tablet 5 milliGRAM(s) Oral daily  aspirin enteric coated 81 milliGRAM(s) Oral daily  atorvastatin 80 milliGRAM(s) Oral at bedtime  carvedilol 6.25 milliGRAM(s) Oral every 12 hours  isosorbide   mononitrate ER Tablet (IMDUR) 30 milliGRAM(s) Oral daily  lisinopril 20 milliGRAM(s) Oral daily  ticagrelor 90 milliGRAM(s) Oral two times a day    MEDICATIONS  (PRN):      FAMILY HISTORY:  Family history of MI (myocardial infarction) (Sibling)    No family history of premature coronary artery disease or sudden cardiac death    SOCIAL HISTORY:  Smoking-  Alcohol-  Ilicit Drug use-    REVIEW OF SYSTEMS:  Constitutional: [ ] fever, [ ]weight loss, [ ]fatigue Activity [ ] Bedbound,[ ] Ambulates [ ] Unassisted[ ] Cane/Walker [ ] Assistence.  Eyes: [ ] visual changes  Respiratory: [ ]shortness of breath;  [ ] cough, [ ]wheezing, [ ]chills, [ ]hemoptysis  Cardiovascular: [ ] chest pain, [ ]palpitations, [ ]dizziness,  [ ]leg swelling[ ]orthopnea [ ]PND  Gastrointestinal: [ ] abdominal pain, [ ]nausea, [ ]vomiting,  [ ]diarrhea,[ ]constipation  Genitourinary: [ ] dysuria, [ ] hematuria  Neurologic: [ ] headaches [ ] tremors[ ] weakness  Skin: [ ] itching, [ ]burning, [ ] rashes  Endocrine: [ ] heat or cold intolerance  Musculoskeletal: [ ] joint pain or swelling; [ ] muscle, back, or extremity pain  Psychiatric: [ ] depression, [ ]anxiety, [ ]mood swings, or [ ]difficulty sleeping  Hematologic: [ ] easy bruising, [ ] bleeding gums       [ x] All others negative	  [ ] Unable to obtain    Vital Signs Last 24 Hrs  T(C): 36.7 (15 Nov 2018 05:02), Max: 37.2 (14 Nov 2018 11:17)  T(F): 98.1 (15 Nov 2018 05:02), Max: 98.9 (14 Nov 2018 11:17)  HR: 76 (15 Nov 2018 05:02) (59 - 100)  BP: 122/66 (15 Nov 2018 05:02) (121/88 - 172/73)  BP(mean): 106 (14 Nov 2018 11:17) (106 - 106)  RR: 16 (15 Nov 2018 05:02) (14 - 20)  SpO2: 95% (15 Nov 2018 05:02) (95% - 100%)  I&O's Summary    14 Nov 2018 07:01  -  15 Nov 2018 07:00  --------------------------------------------------------  IN: 0 mL / OUT: 700 mL / NET: -700 mL        PHYSICAL EXAM:  General: No acute distress BMI-28  HEENT: EOMI, PERRL[ ] Icteric  Neck: Supple, No JVD  Lungs: Equal air entry bilaterally; [ ] Rales [ ] Rhonchi [ ] Wheezing  Heart: Regular rate and rhythm;[x ] Murmurs-   2/6 [x ] Systolic [ ] Diastolic [ ] Radiation,No rubs, or gallops  Abdomen: Nontender, bowel sounds present  Extremities: No clubbing, cyanosis, or edema[ ] Calf tenderness  Nervous system:  Alert & Oriented X3, no focal deficits  Psychiatric: Normal affect  Skin: No rashes or lesions      LABS:  11-14    141  |  104  |  21  ----------------------------<  102<H>  4.6   |  24  |  1.19    Ca    9.9      14 Nov 2018 11:15    TPro  8.3  /  Alb  4.7  /  TBili  0.6  /  DBili  x   /  AST  19  /  ALT  22  /  AlkPhos  63  11-14    Creatinine Trend: 1.19<--, 1.28<--, 1.62<--                        13.3   8.7   )-----------( 222      ( 14 Nov 2018 11:15 )             40.2

## 2018-11-14 NOTE — H&P CARDIOLOGY - HISTORY OF PRESENT ILLNESS
79 y/o Japanese M with PMHx of HTN, HLD, CAD s/p stents (mid and distal RCA) and recently on Oct 16,2018 POBA to m+d RCA and CABG 3V (1990, [facility]) presented today for brachytherapy to RCA to reduce risk of further ISR to RCA. Patient denies any reportable symptoms since discharge from hospital. Here today for cardiac cath for brachy.

## 2018-11-14 NOTE — H&P CARDIOLOGY - PMH
Coronary artery disease    History of coronary artery stent placement  mid and distal RCA  POBA 10/16/18  Hyperlipidemia    Hypertension

## 2018-11-14 NOTE — CONSULT NOTE ADULT - ASSESSMENT
77 y/o Irish M with PMHx of HTN, HLD, CAD s/p stents (mid and distal RCA) and recently on Oct 16,2018 POBA to m+d RCA and CABG 3V (1990, [facility]) presented today for brachytherapy to RCA to reduce risk of further ISR to RCA. Patient denies any reportable symptoms since discharge from hospital. Here today for cardiac cath for brachytherapy.      CAD s/p CABG-PCI-for brachytherapy-mRCA  Continue DAPT.

## 2018-11-14 NOTE — PATIENT PROFILE ADULT - NSTRANSFERBELONGINGSDISPO_GEN_A_NUR
Calorie Counts    12/19: Total Kcal: 834     Total Protein: 45 grams  Incomplete data (only 1 meal recorded)    12/18: Total Kcal: 1540  Total protein: 80 grams  3 meals recorded, 0% of supplements    12/17: Total Kcal: 1503  Total protein: 70 grams  2 meals recorded, 50% of daily supplements     Kaci Borges RD, LD  Unit Pager: [phone]      with patient

## 2018-11-14 NOTE — CONSULT NOTE ADULT - ATTENDING COMMENTS
Patient was seen and examined,interim events noted,labs and radiology studies reviewed.  Chauncey Gonsalez MD,FACC.  [address] [place] [address].  [facility].  [phone]

## 2018-11-14 NOTE — CHART NOTE - NSCHARTNOTEFT_GEN_A_CORE
Removal of Femoral Sheath    Pulses in the right lower extremity are palpable. The patient was placed in the supine position. The insertion site was identified and the sutures were removed per protocol.  The __6__ Welsh femoral sheath was then removed by PAULINA Hilton NP. Direct pressure was applied for  __20____ minutes.     Monitoring of the right groin and both lower extremities including neuro-vascular checks and vital signs every 15 minutes x 4, then every 30 minutes x 2, then every 1 hour was ordered.    Complications: None    Comments: activity restrictions and reportable symptoms discussed with patient

## 2018-11-15 VITALS
HEART RATE: 67 BPM | SYSTOLIC BLOOD PRESSURE: 110 MMHG | TEMPERATURE: 98 F | DIASTOLIC BLOOD PRESSURE: 59 MMHG | OXYGEN SATURATION: 98 % | RESPIRATION RATE: 17 BRPM

## 2018-11-15 PROCEDURE — C1894: CPT

## 2018-11-15 PROCEDURE — 99153 MOD SED SAME PHYS/QHP EA: CPT

## 2018-11-15 PROCEDURE — 77290 THER RAD SIMULAJ FIELD CPLX: CPT

## 2018-11-15 PROCEDURE — 77318 BRACHYTX ISODOSE COMPLEX: CPT

## 2018-11-15 PROCEDURE — 76937 US GUIDE VASCULAR ACCESS: CPT

## 2018-11-15 PROCEDURE — 77470 SPECIAL RADIATION TREATMENT: CPT

## 2018-11-15 PROCEDURE — C1887: CPT

## 2018-11-15 PROCEDURE — 77370 RADIATION PHYSICS CONSULT: CPT

## 2018-11-15 PROCEDURE — 93005 ELECTROCARDIOGRAM TRACING: CPT

## 2018-11-15 PROCEDURE — 92974 CATH PLACE CARDIO BRACHYTX: CPT

## 2018-11-15 PROCEDURE — C1717: CPT

## 2018-11-15 PROCEDURE — 99152 MOD SED SAME PHYS/QHP 5/>YRS: CPT

## 2018-11-15 PROCEDURE — 92920 PRQ TRLUML C ANGIOP 1ART&/BR: CPT | Mod: RC

## 2018-11-15 PROCEDURE — C1728: CPT

## 2018-11-15 PROCEDURE — 80053 COMPREHEN METABOLIC PANEL: CPT

## 2018-11-15 PROCEDURE — C1769: CPT

## 2018-11-15 PROCEDURE — C1725: CPT

## 2018-11-15 PROCEDURE — 85027 COMPLETE CBC AUTOMATED: CPT

## 2018-11-15 PROCEDURE — 77770 HDR RDNCL NTRSTL/ICAV BRCHTX: CPT

## 2018-11-15 RX ADMIN — LISINOPRIL 20 MILLIGRAM(S): 2.5 TABLET ORAL at 05:11

## 2018-11-15 RX ADMIN — Medication 81 MILLIGRAM(S): at 12:47

## 2018-11-15 RX ADMIN — TICAGRELOR 90 MILLIGRAM(S): 90 TABLET ORAL at 05:11

## 2018-11-15 RX ADMIN — AMLODIPINE BESYLATE 5 MILLIGRAM(S): 2.5 TABLET ORAL at 05:11

## 2018-11-15 RX ADMIN — CARVEDILOL PHOSPHATE 6.25 MILLIGRAM(S): 80 CAPSULE, EXTENDED RELEASE ORAL at 05:11

## 2018-11-15 RX ADMIN — TICAGRELOR 90 MILLIGRAM(S): 90 TABLET ORAL at 18:12

## 2018-11-15 RX ADMIN — CARVEDILOL PHOSPHATE 6.25 MILLIGRAM(S): 80 CAPSULE, EXTENDED RELEASE ORAL at 18:12

## 2018-11-15 RX ADMIN — ISOSORBIDE MONONITRATE 30 MILLIGRAM(S): 60 TABLET, EXTENDED RELEASE ORAL at 12:47

## 2018-11-15 NOTE — PROGRESS NOTE ADULT - ATTENDING COMMENTS
Patient was seen and examined,interim events noted,labs and radiology studies reviewed.  Chauncey Gonsalez MD,FACC.  [address] [place] [address].  [facility].  [phone]

## 2018-11-15 NOTE — DISCHARGE NOTE ADULT - PATIENT PORTAL LINK FT
You can access the [facility] Patient Portal, offered by [facility], by registering with the following website: [email or web address]

## 2018-11-15 NOTE — DISCHARGE NOTE ADULT - CARE PROVIDER_API CALL
Chauncey Gonsalez), Medicine  Dept Director  [address] [place] [address]  Phone: [phone]  Fax: [phone]    Luiza Gallagher), Medicine  [address] [place] [address]  Phone: [phone]  Fax: [phone]

## 2018-11-15 NOTE — PROGRESS NOTE ADULT - SUBJECTIVE AND OBJECTIVE BOX
PRESENTING CC:CAD     SUBJ: 77 y/o Syriac M with PMHx of HTN, HLD, CAD s/p stents (mid and distal RCA) and recently on Oct 16,2018 POBA to mRCA and [facility] -underwent successful POBA (3.5mm NC) followed by brachytherapy to the pRCA and mRCA.Remains chest pain free,no overnight events.      PMH -reviewed admission note, no change since admission  Heart failure: acute [ ] chronic [ ] acute or chronic [ ] diastolic [ ] systolic [ ] combined systolic and diastolic[ ]  COOPER: ATN[ ] renal medullary necrosis [ ] CKD I [ ]CKDII [ ]CKD III [ ]CKD IV [ ]CKD V [ ]Other pathological lesions [ ]    MEDICATIONS  (STANDING):  amLODIPine   Tablet 5 milliGRAM(s) Oral daily  aspirin enteric coated 81 milliGRAM(s) Oral daily  atorvastatin 80 milliGRAM(s) Oral at bedtime  carvedilol 6.25 milliGRAM(s) Oral every 12 hours  isosorbide   mononitrate ER Tablet (IMDUR) 30 milliGRAM(s) Oral daily  lisinopril 20 milliGRAM(s) Oral daily  ticagrelor 90 milliGRAM(s) Oral two times a day      FAMILY HISTORY:  Family history of MI (myocardial infarction) (Sibling)  No family history of premature coronary artery disease or sudden cardiac death      REVIEW OF SYSTEMS:  Constitutional: [ ] fever, [ ]weight loss,  [x ]fatigue  Eyes: [ ] visual changes  Respiratory: [ ]shortness of breath;  [ ] cough, [ ]wheezing, [ ]chills, [ ]hemoptysis  Cardiovascular: [ ] chest pain, [ ]palpitations, [ ]dizziness,  [ ]leg swelling[ ]orthopnea[ ]PND  Gastrointestinal: [ ] abdominal pain, [ ]nausea, [ ]vomiting,  [ ]diarrhea   Genitourinary: [ ] dysuria, [ ] hematuria  Neurologic: [ ] headaches [ ] tremors[ ]weakness  Skin: [ ] itching, [ ]burning, [ ] rashes  Endocrine: [ ] heat or cold intolerance  Musculoskeletal: [ ] joint pain or swelling; [ ] muscle, back, or extremity pain  Psychiatric: [ ] depression, [ ]anxiety, [ ]mood swings, or [ ]difficulty sleeping  Hematologic: [ ] easy bruising, [ ] bleeding gums    [x] All remaining systems negative except as per above.   [ ]Unable to obtain.    Vital Signs Last 24 Hrs  T(C): 36.7 (15 Nov 2018 05:02), Max: 37.2 (14 Nov 2018 11:17)  T(F): 98.1 (15 Nov 2018 05:02), Max: 98.9 (14 Nov 2018 11:17)  HR: 76 (15 Nov 2018 05:02) (59 - 100)  BP: 122/66 (15 Nov 2018 05:02) (121/88 - 172/73)  BP(mean): 106 (14 Nov 2018 11:17) (106 - 106)  RR: 16 (15 Nov 2018 05:02) (14 - 20)  SpO2: 95% (15 Nov 2018 05:02) (95% - 100%)  I&O's Summary    14 Nov 2018 07:01  -  15 Nov 2018 07:00  --------------------------------------------------------  IN: 0 mL / OUT: 700 mL / NET: -700 mL        PHYSICAL EXAM:  General: No acute distress BMI-27.8  HEENT: EOMI, PERRL  Neck: Supple, [ ] JVD  Lungs: Equal air entry bilaterally; [ ] rales [ ] wheezing [ ] rhonchi  Heart: Regular rate and rhythm; [x ] murmur  2 /6 [x ] systolic [ ] diastolic [ ] radiation[ ] rubs [ ]  gallops  Abdomen: Nontender, bowel sounds present  Extremities: No clubbing, cyanosis, [ ] edema  Nervous system:  Alert & Oriented X3, no focal deficits  Psychiatric: Normal affect  Skin: No rashes or lesions    LABS:  11-14    141  |  104  |  21  ----------------------------<  102<H>  4.6   |  24  |  1.19    Ca    9.9      14 Nov 2018 11:15    TPro  8.3  /  Alb  4.7  /  TBili  0.6  /  DBili  x   /  AST  19  /  ALT  22  /  AlkPhos  63  11-14    Creatinine Trend: 1.19<--, 1.28<--                        13.3   8.7   )-----------( 222      ( 14 Nov 2018 11:15 )             40.2   TELEMETRY:Sinus rhythm no arrhythmia      CATHETERIZATION:CORONARY VESSELS: Dominance was not assessed.  LM:   --  LM: This vessel was not injected, but was visualized during a prior cardiac catheterization.  LAD:   --  LAD: This vessel was not injected, but was visualized during a prior cardiac catheterization.  CX:   --  Circumflex: This vessel was not injected, but was visualized during a prior cardiac catheterization.  RCA:--  Proximal RCA: There was a diffuse 70 % stenosis at the site of a prior angioplasty with stent placement.  --  Mid RCA: There was a diffuse 90 % stenosis at the site of a prior angioplasty with stent placement. There was FRANCINE grade 3 flow through the vessel (brisk flow). This is a likely culprit for the patient's clinical presentation. An intervention was performed.   Successful POBA (3.5mm NC) followed by brachytherapy to the pRCA and mRCA.      IMPRESSION AND PLAN:      Problem/Plan - 1:  ·  Problem: CAD s/p CABG-  Plan: -Cath--Successful POBA to severe ISR mid and distal RCA  Patent LIMA to LAD  Native left main and native LCx have severe diffuse atherosclerosis (similar to cath report from April 2018)  Successful POBA (3.5mm NC) followed by brachytherapy to the pRCA and mRCA.  Continue DAPT          Problem/Plan - 2:  ·  Problem: Hyperlipidemia.  Plan: Atorvastatin 80mg Daily  C/W with Statin therapy

## 2018-11-15 NOTE — DISCHARGE NOTE ADULT - PLAN OF CARE
stable cont current home meds, follow up with your Cardiologist in 1-2 weeks controlled cont current home meds cont lipitor

## 2018-11-15 NOTE — DISCHARGE NOTE ADULT - CARE PLAN
Principal Discharge DX:	History of coronary artery stent placement  Goal:	stable  Assessment and plan of treatment:	cont current home meds, follow up with your Cardiologist in 1-2 weeks  Secondary Diagnosis:	Hypertension  Goal:	controlled  Assessment and plan of treatment:	cont current home meds  Secondary Diagnosis:	Hyperlipidemia  Goal:	stable  Assessment and plan of treatment:	cont lipitor

## 2018-11-15 NOTE — DISCHARGE NOTE ADULT - HOSPITAL COURSE
79 y/o Tajik M with PMHx of HTN, HLD, CAD s/p stents (mid and distal RCA) and recently on Oct 16,2018 POBA to m+d RCA and CABG 3V (1990, [facility]) presented today for brachytherapy to RCA to reduce risk of further ISR to RCA. Patient denies any reportable symptoms since discharge from hospital. Here today for cardiac cath for brachy.   S/p brachytherapy, seen by cardiologist and will follow up with his cardiologist in 1 week. Spoke to Attending.

## 2019-07-17 NOTE — PATIENT PROFILE ADULT - NSASFUNCLEVELADLAMBULATE_GEN_A_NUR
Problem: Adult Inpatient Plan of Care  Goal: Plan of Care Review  Outcome: Ongoing (interventions implemented as appropriate)  Pt AAOx4. VSS. No acute events during shift. Pt still c/o of nausea. Pt not eating or drinking. Pt reports no relief with PRN Zofran and Reglan. Pt to undergo I/D for L foot wound and amputation of L 5th toe. Pt to be NPO after midnight. Pt presents with flat effect. No c/o pain during shift. Bed locked and lowest position, call bell in reach. WCTM.         0 = independent

## 2020-01-16 ENCOUNTER — EMERGENCY (EMERGENCY)
Facility: HOSPITAL | Age: 80
LOS: 1 days | Discharge: HOME | End: 2020-01-16
Attending: EMERGENCY MEDICINE
Payer: MEDICARE

## 2020-01-16 VITALS
HEIGHT: 66 IN | OXYGEN SATURATION: 99 % | WEIGHT: 169.98 LBS | HEART RATE: 60 BPM | SYSTOLIC BLOOD PRESSURE: 190 MMHG | RESPIRATION RATE: 19 BRPM | TEMPERATURE: 97 F | DIASTOLIC BLOOD PRESSURE: 67 MMHG

## 2020-01-16 DIAGNOSIS — Z95.1 PRESENCE OF AORTOCORONARY BYPASS GRAFT: Chronic | ICD-10-CM

## 2020-01-16 PROCEDURE — 99285 EMERGENCY DEPT VISIT HI MDM: CPT

## 2020-01-16 NOTE — ED ADULT NURSE NOTE - NSIMPLEMENTINTERV_GEN_ALL_ED
Implemented All Universal Safety Interventions:  [place] to call system. Call bell, personal items and telephone within reach. Instruct patient to call for assistance. Room bathroom lighting operational. Non-slip footwear when patient is off stretcher. Physically safe environment: no spills, clutter or unnecessary equipment. Stretcher in lowest position, wheels locked, appropriate side rails in place.

## 2020-01-17 VITALS
TEMPERATURE: 98 F | HEART RATE: 63 BPM | OXYGEN SATURATION: 98 % | DIASTOLIC BLOOD PRESSURE: 67 MMHG | SYSTOLIC BLOOD PRESSURE: 128 MMHG | RESPIRATION RATE: 17 BRPM

## 2020-01-17 PROBLEM — Z95.5 PRESENCE OF CORONARY ANGIOPLASTY IMPLANT AND GRAFT: Chronic | Status: ACTIVE | Noted: 2018-11-14

## 2020-01-17 LAB
ALBUMIN SERPL ELPH-MCNC: 4.1 G/DL — SIGNIFICANT CHANGE UP (ref 3.5–5)
ALP SERPL-CCNC: 63 U/L — SIGNIFICANT CHANGE UP (ref 40–120)
ALT FLD-CCNC: 28 U/L DA — SIGNIFICANT CHANGE UP (ref 10–60)
ANION GAP SERPL CALC-SCNC: 8 MMOL/L — SIGNIFICANT CHANGE UP (ref 5–17)
APPEARANCE UR: CLEAR — SIGNIFICANT CHANGE UP
APTT BLD: 30 SEC — SIGNIFICANT CHANGE UP (ref 27.5–36.3)
AST SERPL-CCNC: 23 U/L — SIGNIFICANT CHANGE UP (ref 10–40)
BILIRUB SERPL-MCNC: 0.9 MG/DL — SIGNIFICANT CHANGE UP (ref 0.2–1.2)
BILIRUB UR-MCNC: NEGATIVE — SIGNIFICANT CHANGE UP
BUN SERPL-MCNC: 18 MG/DL — SIGNIFICANT CHANGE UP (ref 7–18)
CALCIUM SERPL-MCNC: 9.5 MG/DL — SIGNIFICANT CHANGE UP (ref 8.4–10.5)
CHLORIDE SERPL-SCNC: 112 MMOL/L — HIGH (ref 96–108)
CO2 SERPL-SCNC: 25 MMOL/L — SIGNIFICANT CHANGE UP (ref 22–31)
COLOR SPEC: YELLOW — SIGNIFICANT CHANGE UP
CREAT SERPL-MCNC: 1.29 MG/DL — SIGNIFICANT CHANGE UP (ref 0.5–1.3)
DIFF PNL FLD: NEGATIVE — SIGNIFICANT CHANGE UP
EPI CELLS # UR: SIGNIFICANT CHANGE UP /HPF
GLUCOSE SERPL-MCNC: 95 MG/DL — SIGNIFICANT CHANGE UP (ref 70–99)
GLUCOSE UR QL: NEGATIVE — SIGNIFICANT CHANGE UP
HCT VFR BLD CALC: 41.7 % — SIGNIFICANT CHANGE UP (ref 39–50)
HGB BLD-MCNC: 13.2 G/DL — SIGNIFICANT CHANGE UP (ref 13–17)
INR BLD: 0.98 RATIO — SIGNIFICANT CHANGE UP (ref 0.88–1.16)
KETONES UR-MCNC: NEGATIVE — SIGNIFICANT CHANGE UP
LACTATE SERPL-SCNC: 0.9 MMOL/L — SIGNIFICANT CHANGE UP (ref 0.7–2)
LEUKOCYTE ESTERASE UR-ACNC: NEGATIVE — SIGNIFICANT CHANGE UP
MCHC RBC-ENTMCNC: 29.4 PG — SIGNIFICANT CHANGE UP (ref 27–34)
MCHC RBC-ENTMCNC: 31.7 GM/DL — LOW (ref 32–36)
MCV RBC AUTO: 92.9 FL — SIGNIFICANT CHANGE UP (ref 80–100)
NITRITE UR-MCNC: NEGATIVE — SIGNIFICANT CHANGE UP
NRBC # BLD: 0 /100 WBCS — SIGNIFICANT CHANGE UP (ref 0–0)
PH UR: 7 — SIGNIFICANT CHANGE UP (ref 5–8)
PLATELET # BLD AUTO: 190 K/UL — SIGNIFICANT CHANGE UP (ref 150–400)
POTASSIUM SERPL-MCNC: 5 MMOL/L — SIGNIFICANT CHANGE UP (ref 3.5–5.3)
POTASSIUM SERPL-SCNC: 5 MMOL/L — SIGNIFICANT CHANGE UP (ref 3.5–5.3)
PROT SERPL-MCNC: 8.3 G/DL — SIGNIFICANT CHANGE UP (ref 6–8.3)
PROT UR-MCNC: NEGATIVE — SIGNIFICANT CHANGE UP
PROTHROM AB SERPL-ACNC: 10.9 SEC — SIGNIFICANT CHANGE UP (ref 10–12.9)
RBC # BLD: 4.49 M/UL — SIGNIFICANT CHANGE UP (ref 4.2–5.8)
RBC # FLD: 14.6 % — HIGH (ref 10.3–14.5)
RBC CASTS # UR COMP ASSIST: SIGNIFICANT CHANGE UP /HPF (ref 0–2)
SODIUM SERPL-SCNC: 145 MMOL/L — SIGNIFICANT CHANGE UP (ref 135–145)
SP GR SPEC: 1 — LOW (ref 1.01–1.02)
UROBILINOGEN FLD QL: NEGATIVE — SIGNIFICANT CHANGE UP
WBC # BLD: 10.09 K/UL — SIGNIFICANT CHANGE UP (ref 3.8–10.5)
WBC # FLD AUTO: 10.09 K/UL — SIGNIFICANT CHANGE UP (ref 3.8–10.5)
WBC UR QL: SIGNIFICANT CHANGE UP /HPF (ref 0–5)

## 2020-01-17 PROCEDURE — 85610 PROTHROMBIN TIME: CPT

## 2020-01-17 PROCEDURE — 87086 URINE CULTURE/COLONY COUNT: CPT

## 2020-01-17 PROCEDURE — 96374 THER/PROPH/DIAG INJ IV PUSH: CPT

## 2020-01-17 PROCEDURE — 85027 COMPLETE CBC AUTOMATED: CPT

## 2020-01-17 PROCEDURE — 81001 URINALYSIS AUTO W/SCOPE: CPT

## 2020-01-17 PROCEDURE — 74177 CT ABD & PELVIS W/CONTRAST: CPT

## 2020-01-17 PROCEDURE — 80053 COMPREHEN METABOLIC PANEL: CPT

## 2020-01-17 PROCEDURE — 36415 COLL VENOUS BLD VENIPUNCTURE: CPT

## 2020-01-17 PROCEDURE — 85730 THROMBOPLASTIN TIME PARTIAL: CPT

## 2020-01-17 PROCEDURE — 99284 EMERGENCY DEPT VISIT MOD MDM: CPT | Mod: 25

## 2020-01-17 PROCEDURE — 74177 CT ABD & PELVIS W/CONTRAST: CPT | Mod: 26

## 2020-01-17 PROCEDURE — 83605 ASSAY OF LACTIC ACID: CPT

## 2020-01-17 PROCEDURE — 87186 SC STD MICRODIL/AGAR DIL: CPT

## 2020-01-17 RX ORDER — MORPHINE SULFATE 50 MG/1
2 CAPSULE, EXTENDED RELEASE ORAL ONCE
Refills: 0 | Status: DISCONTINUED | OUTPATIENT
Start: 2020-01-17 | End: 2020-01-17

## 2020-01-17 RX ORDER — IOHEXOL 300 MG/ML
30 INJECTION, SOLUTION INTRAVENOUS ONCE
Refills: 0 | Status: COMPLETED | OUTPATIENT
Start: 2020-01-17 | End: 2020-01-17

## 2020-01-17 RX ADMIN — IOHEXOL 30 MILLILITER(S): 300 INJECTION, SOLUTION INTRAVENOUS at 03:06

## 2020-01-17 RX ADMIN — MORPHINE SULFATE 2 MILLIGRAM(S): 50 CAPSULE, EXTENDED RELEASE ORAL at 03:06

## 2020-01-17 NOTE — ED PROVIDER NOTE - PATIENT PORTAL LINK FT
You can access the FollowMyHealth Patient Portal offered by [facility] by registering at the following website: [email or web address]. By joining [employer]’s FollowMyHealth portal, you will also be able to view your health information using other applications (apps) compatible with our system.

## 2020-01-17 NOTE — ED PROVIDER NOTE - PROGRESS NOTE DETAILS
labs - no acute findings  UA - wnl   CT - no obstruction, no appendicitis, fat containing inguinal hernia  Symptoms like MSK. Pain controlled. Ambulatory in ED. Will dc with symptomatic support and PCP fu. Discussed indications for patient return to ED. Patient understood.

## 2020-01-17 NOTE — ED PROVIDER NOTE - PHYSICAL EXAMINATION
GENERAL: well appearing, no acute distress   HEAD: atraumatic   EYES: EOMI, pink conjunctiva   ENT: moist oral mucosa   CARDIAC: RRR, no edema, distal pulses present   RESPIRATORY: lungs CTAB, no increased work of breathing   GASTROINTESTINAL: +RLQ abdominal tenderness, no rebound or guarding, bowel sounds presents, healed surgical scar to R inguinal area   GENITOURINARY: no CVA tenderness; normal external genitalia    MUSCULOSKELETAL: no deformity; painful but full R hip ROM   NEUROLOGICAL: AAOx3, CN's II-XII intact, strength 5/5 bilateral UE and LE, sensation intact to light touch, finger to nose intact, steady gait  SKIN: intact   PSYCHIATRIC: cooperative  HEME LYMPH: no lymphadenopathy

## 2020-01-17 NOTE — ED PROVIDER NOTE - OBJECTIVE STATEMENT
80 yo M pmh of CAD s/p stents and CABG, HTN, and psh of R inguinal hernia repair presents with R hip and RLQ abd pain x approx 10 days, constant, getting worse yesterday, pain worse with walking. Denies fever, back pain, incontinence, weakness, urinary complaints, diarrhea, constipation, n/v, other acute complaints.  [id number].

## 2020-01-17 NOTE — ED PROVIDER NOTE - CLINICAL SUMMARY MEDICAL DECISION MAKING FREE TEXT BOX
78 yo M with abd/groin pain, worse with hip movement; likely MSK. However given hernia repair, will need eval for obstruction, appy, renal stone, vs other. Plan - UA, labs, CT, meds, reassess.

## 2020-01-17 NOTE — ED PROVIDER NOTE - NSFOLLOWUPINSTRUCTIONS_ED_ALL_ED_FT
GROIN PAIN - AfterCare(R) Instructions(ER/ED)     Groin Pain    WHAT YOU NEED TO KNOW:    Groin pain may be felt only in your groin, or it may spread to your buttocks, thigh, or knee. An injury to your hip joint, pelvic area, lower back, or thighs can cause groin pain.    DISCHARGE INSTRUCTIONS:    Medicines: You may need any of the following:     NSAIDs, such as ibuprofen, help decrease swelling, pain, and fever. This medicine is available with or without a doctor's order. NSAIDs can cause stomach bleeding or kidney problems in certain people. If you take blood thinner medicine, always ask if NSAIDs are safe for you. Always read the medicine label and follow directions. Do not give these medicines to children under 6 months of age without direction from your child's healthcare provider.      Acetaminophen decreases pain. It is available without a doctor's order. Ask how much to take and how often to take it. Follow directions. Acetaminophen can cause liver damage if not taken correctly.       Take your medicine as directed. Contact your healthcare provider if you think your medicine is not helping or if you have side effects. Tell him of her if you are allergic to any medicine. Keep a list of the medicines, vitamins, and herbs you take. Include the amounts, and when and why you take them. Bring the list or the pill bottles to follow-up visits. Carry your medicine list with you in case of an emergency.    Follow up with your healthcare provider as directed: You may need to return for more tests. Write down your questions so you remember to ask them during your visits.     Self-care:     Rest as much as possible. Avoid activities that cause or increase your pain.      Apply ice on your groin for 15 to 20 minutes every hour or as directed. Use an ice pack, or put crushed ice in a plastic bag. Cover it with a towel. Ice helps prevent tissue damage and decreases swelling and pain.       Apply heat on your groin for 20 to 30 minutes every 2 hours for as many days as directed. Heat helps decrease pain and muscle spasms.     Contact your healthcare provider if:     You have a fever.       You have questions or concerns about your condition or care.    Return to the emergency department if:     You have severe pain even after you take medicine.       You have pain or burning when you urinate.       You have pain on your side that spreads to your groin.         © Copyright [employer] 2020       back to top                      © Copyright [employer] 2020

## 2020-01-17 NOTE — ED PROVIDER NOTE - NS ED ROS FT
CONSTITUTIONAL: no fever, no chills   EYES: no visual changes, no eye pain   ENMT: no nasal congestion, no throat pain  CARDIOVASCULAR: no chest pain, no edema, no palpitations   RESPIRATORY: no shortness of breath, no cough   GASTROINTESTINAL: +abdominal pain, no nausea, no vomiting, no diarrhea, no constipation   GENITOURINARY: no dysuria, no frequency  MUSCULOSKELETAL: +joint pain, no myalgias, no back pain   SKIN: no rashes  NEUROLOGICAL: no weakness, no headache, no dizziness, no slurred speech, no syncope   PSYCHIATRIC: no known mental health illness   HEME/LYMPH: no lymphadenopathy      All other ROS negative except as per HPI

## 2020-01-19 LAB
-  AMIKACIN: SIGNIFICANT CHANGE UP
-  AMPICILLIN/SULBACTAM: SIGNIFICANT CHANGE UP
-  AMPICILLIN: SIGNIFICANT CHANGE UP
-  AZTREONAM: SIGNIFICANT CHANGE UP
-  CEFAZOLIN: SIGNIFICANT CHANGE UP
-  CEFEPIME: SIGNIFICANT CHANGE UP
-  CEFOXITIN: SIGNIFICANT CHANGE UP
-  CEFTRIAXONE: SIGNIFICANT CHANGE UP
-  CIPROFLOXACIN: SIGNIFICANT CHANGE UP
-  GENTAMICIN: SIGNIFICANT CHANGE UP
-  LEVOFLOXACIN: SIGNIFICANT CHANGE UP
-  MEROPENEM: SIGNIFICANT CHANGE UP
-  NITROFURANTOIN: SIGNIFICANT CHANGE UP
-  PIPERACILLIN/TAZOBACTAM: SIGNIFICANT CHANGE UP
-  TOBRAMYCIN: SIGNIFICANT CHANGE UP
-  TRIMETHOPRIM/SULFAMETHOXAZOLE: SIGNIFICANT CHANGE UP
CULTURE RESULTS: SIGNIFICANT CHANGE UP
METHOD TYPE: SIGNIFICANT CHANGE UP
ORGANISM # SPEC MICROSCOPIC CNT: SIGNIFICANT CHANGE UP
ORGANISM # SPEC MICROSCOPIC CNT: SIGNIFICANT CHANGE UP
SPECIMEN SOURCE: SIGNIFICANT CHANGE UP

## 2020-10-01 NOTE — PATIENT PROFILE ADULT. - FUNCTIONAL SCREEN CURRENT LEVEL: EATING, MLM
Dr. Vee Mitchell,  Patient looking for refill on Norco - he is out - medication pended. Please advise and if needed approve pended medication. (0) independent

## 2022-10-18 NOTE — ED CLERICAL - DIVISION
[facility] Siliq Counseling:  I discussed with the patient the risks of Siliq including but not limited to new or worsening depression, suicidal thoughts and behavior, immunosuppression, malignancy, posterior leukoencephalopathy syndrome, and serious infections.  The patient understands that monitoring is required including a PPD at baseline and must alert us or the primary physician if symptoms of infection or other concerning signs are noted. There is also a special program designed to monitor depression which is required with Siliq.

## 2023-12-21 NOTE — DISCHARGE NOTE ADULT - NS AS DC AMI YN
Anupam Young is a 41 year old male who had 53 lipomas taken off of him a few weeks ago.  All healed well except for one site which developed redness and some drainage.  He has expressed pus out of it twice.    On his right thigh he has a partially opened wound with redness and infection around it.    I prepped it, and then anesthestized the area around the wound with 1% lidocaine with epinephrine.  Using a 15 blade I opened the wound completely.  Using a hemostat and debridement.  There was minimal infection within it as the patient had already expressed it.  I then packed it with 1/4 inch packing strip.    I educated the patient on changing the packing.  He is to do this once a day for the next 5 days.  On the fifth day can remove the packing and just cover the wound that should close.  He does not require antibiotics.    Parker Hare M.D., F.A.C.S.  [facility]  [facility]  [place], [place]  
no

## 2024-06-27 NOTE — DISCHARGE NOTE ADULT - CARE PROVIDER_API CALL
Chauncey Gonsalez), Medicine  Dept Director  [address] [place], [place] [id number]  Phone: [phone]  Fax: [phone] No
